# Patient Record
Sex: MALE | URBAN - METROPOLITAN AREA
[De-identification: names, ages, dates, MRNs, and addresses within clinical notes are randomized per-mention and may not be internally consistent; named-entity substitution may affect disease eponyms.]

---

## 2019-10-09 ENCOUNTER — APPOINTMENT (RX ONLY)
Dept: URBAN - METROPOLITAN AREA CLINIC 61 | Facility: CLINIC | Age: 69
Setting detail: DERMATOLOGY
End: 2019-10-09

## 2019-10-09 DIAGNOSIS — L82.1 OTHER SEBORRHEIC KERATOSIS: ICD-10-CM

## 2019-10-09 PROBLEM — D48.5 NEOPLASM OF UNCERTAIN BEHAVIOR OF SKIN: Status: ACTIVE | Noted: 2019-10-09

## 2019-10-09 PROCEDURE — ? BIOPSY BY SHAVE METHOD

## 2019-10-09 PROCEDURE — ? COUNSELING

## 2019-10-09 PROCEDURE — 11102 TANGNTL BX SKIN SINGLE LES: CPT

## 2019-10-09 PROCEDURE — 99202 OFFICE O/P NEW SF 15 MIN: CPT | Mod: 25

## 2019-10-09 ASSESSMENT — LOCATION SIMPLE DESCRIPTION DERM
LOCATION SIMPLE: LEFT FOREHEAD
LOCATION SIMPLE: RIGHT CALF

## 2019-10-09 ASSESSMENT — LOCATION DETAILED DESCRIPTION DERM
LOCATION DETAILED: RIGHT DISTAL CALF
LOCATION DETAILED: LEFT LATERAL FOREHEAD

## 2019-10-09 ASSESSMENT — LOCATION ZONE DERM
LOCATION ZONE: LEG
LOCATION ZONE: FACE

## 2019-10-09 NOTE — PROCEDURE: BIOPSY BY SHAVE METHOD
Depth Of Biopsy: dermis
Electrodesiccation Text: The wound bed was treated with electrodesiccation after the biopsy was performed.
Notification Instructions: Patient will be notified of biopsy results. However, patient instructed to call the office if not contacted within 2 weeks.
Biopsy Type: H and E
X Size Of Lesion In Cm: 0
Bill For Surgical Tray: no
Wound Care: Vaseline
Anesthesia Volume In Cc (Will Not Render If 0): 1
Dressing: pressure dressing with telfa
Type Of Destruction Used: Electrodesiccation
Electrodesiccation And Curettage Text: The wound bed was treated with electrodesiccation and curettage after the biopsy was performed.
Consent: Verbal consent was obtained and risks were reviewed including but not limited to scarring, infection, bleeding, scabbing, incomplete removal, nerve damage and allergy to anesthesia.
Biopsy Method: Dermablade
Hemostasis: Drysol
Silver Nitrate Text: The wound bed was treated with silver nitrate after the biopsy was performed.
Render Post-Care Instructions In Note?: yes
Cryotherapy Text: The wound bed was treated with cryotherapy after the biopsy was performed.
Anesthesia Type: 1% lidocaine without epinephrine
Lab Facility: [id number]
Post-Care Instructions: I reviewed with the patient in detail post-care instructions. Patient is to keep the biopsy site dry overnight, and then apply vaseline twice daily until healed.
Detail Level: Detailed
Lab: [id number]
Billing Type: Third-Party Bill

## 2019-11-01 ENCOUNTER — APPOINTMENT (RX ONLY)
Dept: URBAN - METROPOLITAN AREA CLINIC 61 | Facility: CLINIC | Age: 69
Setting detail: DERMATOLOGY
End: 2019-11-01

## 2019-11-01 VITALS
HEART RATE: 80 BPM | SYSTOLIC BLOOD PRESSURE: 153 MMHG | WEIGHT: 253 LBS | HEIGHT: 71 IN | DIASTOLIC BLOOD PRESSURE: 99 MMHG

## 2019-11-01 PROBLEM — C44.712 BASAL CELL CARCINOMA OF SKIN OF RIGHT LOWER LIMB, INCLUDING HIP: Status: ACTIVE | Noted: 2019-11-01

## 2019-11-01 PROCEDURE — ? PRESCRIPTION

## 2019-11-01 PROCEDURE — 11604 EXC TR-EXT MAL+MARG 3.1-4 CM: CPT

## 2019-11-01 PROCEDURE — 13122 CMPLX RPR S/A/L ADDL 5 CM/>: CPT

## 2019-11-01 PROCEDURE — 13121 CMPLX RPR S/A/L 2.6-7.5 CM: CPT

## 2019-11-01 PROCEDURE — ? EXCISION

## 2019-11-01 RX ORDER — MUPIROCIN 20 MG/G
OINTMENT TOPICAL DAILY
Qty: 1 | Refills: 1 | Status: ERX | COMMUNITY
Start: 2019-11-01

## 2019-11-01 RX ORDER — DOXYCYCLINE HYCLATE 100 MG/1
CAPSULE, GELATIN COATED ORAL
Qty: 14 | Refills: 0 | Status: ERX | COMMUNITY
Start: 2019-11-01

## 2019-11-01 RX ADMIN — DOXYCYCLINE HYCLATE: 100 CAPSULE, GELATIN COATED ORAL at 00:00

## 2019-11-01 RX ADMIN — MUPIROCIN: 20 OINTMENT TOPICAL at 00:00

## 2019-11-01 NOTE — PROCEDURE: EXCISION
Render Post-Care Instructions In Note?: yes
Curettage Prior To Excision?: No
Repair Type: Complex
Double M-Plasty Intermediate Repair Preamble Text (Leave Blank If You Do Not Want): Undermining was performed with blunt dissection.
O-Z Flap Text: The defect edges were debeveled with a #15 scalpel blade.  Given the location of the defect, shape of the defect and the proximity to free margins an O-Z flap was deemed most appropriate.  Using a sterile surgical marker, an appropriate transposition flap was drawn incorporating the defect and placing the expected incisions within the relaxed skin tension lines where possible. The area thus outlined was incised deep to adipose tissue with a #15 scalpel blade.  The skin margins were undermined to an appropriate distance in all directions utilizing iris scissors.
Where Do You Want The Question To Include Opioid Counseling Located?: Case Summary Tab
Complex Repair And W Plasty Text: The defect edges were debeveled with a #15 scalpel blade.  The primary defect was closed partially with a complex linear closure.  Given the location of the remaining defect, shape of the defect and the proximity to free margins a W plasty was deemed most appropriate for complete closure of the defect.  Using a sterile surgical marker, an appropriate advancement flap was drawn incorporating the defect and placing the expected incisions within the relaxed skin tension lines where possible.    The area thus outlined was incised deep to adipose tissue with a #15 scalpel blade.  The skin margins were undermined to an appropriate distance in all directions utilizing iris scissors.
Ear Star Wedge Flap Text: The defect edges were debeveled with a #15 blade scalpel.  Given the location of the defect and the proximity to free margins (helical rim) an ear star wedge flap was deemed most appropriate.  Using a sterile surgical marker, the appropriate flap was drawn incorporating the defect and placing the expected incisions between the helical rim and antihelix where possible.  The area thus outlined was incised through and through with a #15 scalpel blade.
Bilateral Helical Rim Advancement Flap Text: The defect edges were debeveled with a #15 blade scalpel.  Given the location of the defect and the proximity to free margins (helical rim) a bilateral helical rim advancement flap was deemed most appropriate.  Using a sterile surgical marker, the appropriate advancement flaps were drawn incorporating the defect and placing the expected incisions between the helical rim and antihelix where possible.  The area thus outlined was incised through and through with a #15 scalpel blade.  With a skin hook and iris scissors, the flaps were gently and sharply undermined and freed up.
W Plasty Text: The lesion was extirpated to the level of the fat with a #15 scalpel blade.  Given the location of the defect, shape of the defect and the proximity to free margins a W-plasty was deemed most appropriate for repair.  Using a sterile surgical marker, the appropriate transposition arms of the W-plasty were drawn incorporating the defect and placing the expected incisions within the relaxed skin tension lines where possible.    The area thus outlined was incised deep to adipose tissue with a #15 scalpel blade.  The skin margins were undermined to an appropriate distance in all directions utilizing iris scissors.  The opposing transposition arms were then transposed into place in opposite direction and anchored with interrupted buried subcutaneous sutures.
Purse String (Intermediate) Text: Given the location of the defect and the characteristics of the surrounding skin a pursestring intermediate closure was deemed most appropriate.  Undermining was performed circumfirentially around the surgical defect.  A purstring suture was then placed and tightened.
Complex Repair And Z Plasty Text: The defect edges were debeveled with a #15 scalpel blade.  The primary defect was closed partially with a complex linear closure.  Given the location of the remaining defect, shape of the defect and the proximity to free margins a Z plasty was deemed most appropriate for complete closure of the defect.  Using a sterile surgical marker, an appropriate advancement flap was drawn incorporating the defect and placing the expected incisions within the relaxed skin tension lines where possible.    The area thus outlined was incised deep to adipose tissue with a #15 scalpel blade.  The skin margins were undermined to an appropriate distance in all directions utilizing iris scissors.
Curvilinear Excision Additional Text (Leave Blank If You Do Not Want): The margin was drawn around the clinically apparent lesion.  A curvilinear shape was then drawn on the skin incorporating the lesion and margins.  Incisions were then made along these lines to the appropriate tissue plane and the lesion was extirpated.
Double O-Z Flap Text: The defect edges were debeveled with a #15 scalpel blade.  Given the location of the defect, shape of the defect and the proximity to free margins a Double O-Z flap was deemed most appropriate.  Using a sterile surgical marker, an appropriate transposition flap was drawn incorporating the defect and placing the expected incisions within the relaxed skin tension lines where possible. The area thus outlined was incised deep to adipose tissue with a #15 scalpel blade.  The skin margins were undermined to an appropriate distance in all directions utilizing iris scissors.
Anesthesia Type: 1% lidocaine with epinephrine
Banner Transposition Flap Text: The defect edges were debeveled with a #15 scalpel blade.  Given the location of the defect and the proximity to free margins a Banner transposition flap was deemed most appropriate.  Using a sterile surgical marker, an appropriate flap drawn around the defect. The area thus outlined was incised deep to adipose tissue with a #15 scalpel blade.  The skin margins were undermined to an appropriate distance in all directions utilizing iris scissors.
Anesthesia Type: 1% lidocaine without epinephrine
Cheek Interpolation Flap Text: A decision was made to reconstruct the defect utilizing an interpolation axial flap and a staged reconstruction.  A telfa template was made of the defect.  This telfa template was then used to outline the Cheek Interpolation flap.  The donor area for the pedicle flap was then injected with anesthesia.  The flap was excised through the skin and subcutaneous tissue down to the layer of the underlying musculature.  The interpolation flap was carefully excised within this deep plane to maintain its blood supply.  The edges of the donor site were undermined.   The donor site was closed in a primary fashion.  The pedicle was then rotated into position and sutured.  Once the tube was sutured into place, adequate blood supply was confirmed with blanching and refill.  The pedicle was then wrapped with xeroform gauze and dressed appropriately with a telfa and gauze bandage to ensure continued blood supply and protect the attached pedicle.
Z Plasty Text: The lesion was extirpated to the level of the fat with a #15 scalpel blade.  Given the location of the defect, shape of the defect and the proximity to free margins a Z-plasty was deemed most appropriate for repair.  Using a sterile surgical marker, the appropriate transposition arms of the Z-plasty were drawn incorporating the defect and placing the expected incisions within the relaxed skin tension lines where possible.    The area thus outlined was incised deep to adipose tissue with a #15 scalpel blade.  The skin margins were undermined to an appropriate distance in all directions utilizing iris scissors.  The opposing transposition arms were then transposed into place in opposite direction and anchored with interrupted buried subcutaneous sutures.
Purse String (Simple) Text: Given the location of the defect and the characteristics of the surrounding skin a purse string simple closure was deemed most appropriate.  Undermining was performed circumferentially around the surgical defect.  A purse string suture was then placed and tightened.
Wound Care: Vaseline
Bilobed Flap Text: The defect edges were debeveled with a #15 scalpel blade.  Given the location of the defect and the proximity to free margins a bilobe flap was deemed most appropriate.  Using a sterile surgical marker, an appropriate bilobe flap drawn around the defect.    The area thus outlined was incised deep to adipose tissue with a #15 scalpel blade.  The skin margins were undermined to an appropriate distance in all directions utilizing iris scissors.
Post-Care Instructions: I reviewed with the patient in detail post-care instructions. Patient is not to engage in any heavy lifting, exercise, or swimming for the next 14 days. Should the patient develop any fevers, chills, bleeding, severe pain patient will contact the office immediately.
Cheek-To-Nose Interpolation Flap Text: A decision was made to reconstruct the defect utilizing an interpolation axial flap and a staged reconstruction.  A telfa template was made of the defect.  This telfa template was then used to outline the Cheek-To-Nose Interpolation flap.  The donor area for the pedicle flap was then injected with anesthesia.  The flap was excised through the skin and subcutaneous tissue down to the layer of the underlying musculature.  The interpolation flap was carefully excised within this deep plane to maintain its blood supply.  The edges of the donor site were undermined.   The donor site was closed in a primary fashion.  The pedicle was then rotated into position and sutured.  Once the tube was sutured into place, adequate blood supply was confirmed with blanching and refill.  The pedicle was then wrapped with xeroform gauze and dressed appropriately with a telfa and gauze bandage to ensure continued blood supply and protect the attached pedicle.
Epidermal Closure Graft Donor Site (Optional): simple interrupted
Partial Purse String (Simple) Text: Given the location of the defect and the characteristics of the surrounding skin a simple purse string closure was deemed most appropriate.  Undermining was performed circumferentially around the surgical defect.  A purse string suture was then placed and tightened. Wound tension of the circular defect prevented complete closure of the wound.
Intermediate / Complex Repair - Final Wound Length In Cm: 9
Partial Purse String (Intermediate) Text: Given the location of the defect and the characteristics of the surrounding skin an intermediate purse string closure was deemed most appropriate.  Undermining was performed circumferentially around the surgical defect.  A purse string suture was then placed and tightened. Wound tension of the circular defect prevented complete closure of the wound.
Fusiform Excision Additional Text (Leave Blank If You Do Not Want): The margin was drawn around the clinically apparent lesion.  A fusiform shape was then drawn on the skin incorporating the lesion and margins.  Incisions were then made along these lines to the appropriate tissue plane and the lesion was extirpated.
Complex Repair And Dorsal Nasal Flap Text: The defect edges were debeveled with a #15 scalpel blade.  The primary defect was closed partially with a complex linear closure.  Given the location of the remaining defect, shape of the defect and the proximity to free margins a dorsal nasal flap was deemed most appropriate for complete closure of the defect.  Using a sterile surgical marker, an appropriate flap was drawn incorporating the defect and placing the expected incisions within the relaxed skin tension lines where possible.    The area thus outlined was incised deep to adipose tissue with a #15 scalpel blade.  The skin margins were undermined to an appropriate distance in all directions utilizing iris scissors.
V-Y Flap Text: The defect edges were debeveled with a #15 scalpel blade.  Given the location of the defect, shape of the defect and the proximity to free margins a V-Y flap was deemed most appropriate.  Using a sterile surgical marker, an appropriate advancement flap was drawn incorporating the defect and placing the expected incisions within the relaxed skin tension lines where possible.    The area thus outlined was incised deep to adipose tissue with a #15 scalpel blade.  The skin margins were undermined to an appropriate distance in all directions utilizing iris scissors.
Complex Repair And Single Advancement Flap Text: The defect edges were debeveled with a #15 scalpel blade.  The primary defect was closed partially with a complex linear closure.  Given the location of the remaining defect, shape of the defect and the proximity to free margins a single advancement flap was deemed most appropriate for complete closure of the defect.  Using a sterile surgical marker, an appropriate advancement flap was drawn incorporating the defect and placing the expected incisions within the relaxed skin tension lines where possible.    The area thus outlined was incised deep to adipose tissue with a #15 scalpel blade.  The skin margins were undermined to an appropriate distance in all directions utilizing iris scissors.
Interpolation Flap Text: A decision was made to reconstruct the defect utilizing an interpolation axial flap and a staged reconstruction.  A telfa template was made of the defect.  This telfa template was then used to outline the interpolation flap.  The donor area for the pedicle flap was then injected with anesthesia.  The flap was excised through the skin and subcutaneous tissue down to the layer of the underlying musculature.  The interpolation flap was carefully excised within this deep plane to maintain its blood supply.  The edges of the donor site were undermined.   The donor site was closed in a primary fashion.  The pedicle was then rotated into position and sutured.  Once the tube was sutured into place, adequate blood supply was confirmed with blanching and refill.  The pedicle was then wrapped with xeroform gauze and dressed appropriately with a telfa and gauze bandage to ensure continued blood supply and protect the attached pedicle.
Anesthesia Volume In Cc: 0
Home Suture Removal Text: Patient was provided a home suture removal kit and will remove their sutures at home.  If they have any questions or difficulties they will call the office.
Saucerization Excision Additional Text (Leave Blank If You Do Not Want): The margin was drawn around the clinically apparent lesion.  Incisions were then made along these lines, in a tangential fashion, to the appropriate tissue plane and the lesion was extirpated.
Dressing: pressure dressing with telfa
Elliptical Excision Additional Text (Leave Blank If You Do Not Want): The margin was drawn around the clinically apparent lesion.  An elliptical shape was then drawn on the skin incorporating the lesion and margins.  Incisions were then made along these lines to the appropriate tissue plane and the lesion was extirpated.
Advancement-Rotation Flap Text: The defect edges were debeveled with a #15 scalpel blade.  Given the location of the defect, shape of the defect and the proximity to free margins an advancement-rotation flap was deemed most appropriate.  Using a sterile surgical marker, an appropriate flap was drawn incorporating the defect and placing the expected incisions within the relaxed skin tension lines where possible. The area thus outlined was incised deep to adipose tissue with a #15 scalpel blade.  The skin margins were undermined to an appropriate distance in all directions utilizing iris scissors.
Complex Repair And Ftsg Text: The defect edges were debeveled with a #15 scalpel blade.  The primary defect was closed partially with a complex linear closure.  Given the location of the defect, shape of the defect and the proximity to free margins a full thickness skin graft was deemed most appropriate to repair the remaining defect.  The graft was trimmed to fit the size of the remaining defect.  The graft was then placed in the primary defect, oriented appropriately, and sutured into place.
Bilobed Transposition Flap Text: The defect edges were debeveled with a #15 scalpel blade.  Given the location of the defect and the proximity to free margins a bilobed transposition flap was deemed most appropriate.  Using a sterile surgical marker, an appropriate bilobe flap drawn around the defect.    The area thus outlined was incised deep to adipose tissue with a #15 scalpel blade.  The skin margins were undermined to an appropriate distance in all directions utilizing iris scissors.
Slit Excision Additional Text (Leave Blank If You Do Not Want): A linear line was drawn on the skin overlying the lesion. An incision was made slowly until the lesion was visualized.  Once visualized, the lesion was removed with blunt dissection.
Complex Repair And Epidermal Autograft Text: The defect edges were debeveled with a #15 scalpel blade.  The primary defect was closed partially with a complex linear closure.  Given the location of the defect, shape of the defect and the proximity to free margins an epidermal autograft was deemed most appropriate to repair the remaining defect.  The graft was trimmed to fit the size of the remaining defect.  The graft was then placed in the primary defect, oriented appropriately, and sutured into place.
Modified Advancement Flap Text: The defect edges were debeveled with a #15 scalpel blade.  Given the location of the defect, shape of the defect and the proximity to free margins a modified advancement flap was deemed most appropriate.  Using a sterile surgical marker, an appropriate advancement flap was drawn incorporating the defect and placing the expected incisions within the relaxed skin tension lines where possible.    The area thus outlined was incised deep to adipose tissue with a #15 scalpel blade.  The skin margins were undermined to an appropriate distance in all directions utilizing iris scissors.
Complex Repair And Split-Thickness Skin Graft Text: The defect edges were debeveled with a #15 scalpel blade.  The primary defect was closed partially with a complex linear closure.  Given the location of the defect, shape of the defect and the proximity to free margins a split thickness skin graft was deemed most appropriate to repair the remaining defect.  The graft was trimmed to fit the size of the remaining defect.  The graft was then placed in the primary defect, oriented appropriately, and sutured into place.
Mercedes Flap Text: The defect edges were debeveled with a #15 scalpel blade.  Given the location of the defect, shape of the defect and the proximity to free margins a Mercedes flap was deemed most appropriate.  Using a sterile surgical marker, an appropriate advancement flap was drawn incorporating the defect and placing the expected incisions within the relaxed skin tension lines where possible. The area thus outlined was incised deep to adipose tissue with a #15 scalpel blade.  The skin margins were undermined to an appropriate distance in all directions utilizing iris scissors.
Trilobed Flap Text: The defect edges were debeveled with a #15 scalpel blade.  Given the location of the defect and the proximity to free margins a trilobed flap was deemed most appropriate.  Using a sterile surgical marker, an appropriate trilobed flap drawn around the defect.    The area thus outlined was incised deep to adipose tissue with a #15 scalpel blade.  The skin margins were undermined to an appropriate distance in all directions utilizing iris scissors.
Melolabial Interpolation Flap Text: A decision was made to reconstruct the defect utilizing an interpolation axial flap and a staged reconstruction.  A telfa template was made of the defect.  This telfa template was then used to outline the melolabial interpolation flap.  The donor area for the pedicle flap was then injected with anesthesia.  The flap was excised through the skin and subcutaneous tissue down to the layer of the underlying musculature.  The pedicle flap was carefully excised within this deep plane to maintain its blood supply.  The edges of the donor site were undermined.   The donor site was closed in a primary fashion.  The pedicle was then rotated into position and sutured.  Once the tube was sutured into place, adequate blood supply was confirmed with blanching and refill.  The pedicle was then wrapped with xeroform gauze and dressed appropriately with a telfa and gauze bandage to ensure continued blood supply and protect the attached pedicle.
Complex Repair And Double Advancement Flap Text: The defect edges were debeveled with a #15 scalpel blade.  The primary defect was closed partially with a complex linear closure.  Given the location of the remaining defect, shape of the defect and the proximity to free margins a double advancement flap was deemed most appropriate for complete closure of the defect.  Using a sterile surgical marker, an appropriate advancement flap was drawn incorporating the defect and placing the expected incisions within the relaxed skin tension lines where possible.    The area thus outlined was incised deep to adipose tissue with a #15 scalpel blade.  The skin margins were undermined to an appropriate distance in all directions utilizing iris scissors.
Excisional Biopsy Additional Text (Leave Blank If You Do Not Want): The margin was drawn around the clinically apparent lesion.  An elliptical shape was then drawn on the skin incorporating the lesion and margins.  Incisions were then made along these lines to the appropriate tissue plane and the lesion was extirpated.
Mucosal Advancement Flap Text: Given the location of the defect, shape of the defect and the proximity to free margins a mucosal advancement flap was deemed most appropriate. Incisions were made with a 15 blade scalpel in the appropriate fashion along the cutaneous vermillion border and the mucosal lip. The remaining actinically damaged mucosal tissue was excised.  The mucosal advancement flap was then elevated to the gingival sulcus with care taken to preserve the neurovascular structures and advanced into the primary defect. Care was taken to ensure that precise realignment of the vermillion border was achieved.
Billing Type: Third-Party Bill
Complex Repair And Dermal Autograft Text: The defect edges were debeveled with a #15 scalpel blade.  The primary defect was closed partially with a complex linear closure.  Given the location of the defect, shape of the defect and the proximity to free margins an dermal autograft was deemed most appropriate to repair the remaining defect.  The graft was trimmed to fit the size of the remaining defect.  The graft was then placed in the primary defect, oriented appropriately, and sutured into place.
Island Pedicle Flap Text: The defect edges were debeveled with a #15 scalpel blade.  Given the location of the defect, shape of the defect and the proximity to free margins an island pedicle advancement flap was deemed most appropriate.  Using a sterile surgical marker, an appropriate advancement flap was drawn incorporating the defect, outlining the appropriate donor tissue and placing the expected incisions within the relaxed skin tension lines where possible.    The area thus outlined was incised deep to adipose tissue with a #15 scalpel blade.  The skin margins were undermined to an appropriate distance in all directions around the primary defect and laterally outward around the island pedicle utilizing iris scissors.  There was minimal undermining beneath the pedicle flap.
Dorsal Nasal Flap Text: The defect edges were debeveled with a #15 scalpel blade.  Given the location of the defect and the proximity to free margins a dorsal nasal flap was deemed most appropriate.  Using a sterile surgical marker, an appropriate dorsal nasal flap was drawn around the defect.    The area thus outlined was incised deep to adipose tissue with a #15 scalpel blade.  The skin margins were undermined to an appropriate distance in all directions utilizing iris scissors.
Mastoid Interpolation Flap Text: A decision was made to reconstruct the defect utilizing an interpolation axial flap and a staged reconstruction.  A telfa template was made of the defect.  This telfa template was then used to outline the mastoid interpolation flap.  The donor area for the pedicle flap was then injected with anesthesia.  The flap was excised through the skin and subcutaneous tissue down to the layer of the underlying musculature.  The pedicle flap was carefully excised within this deep plane to maintain its blood supply.  The edges of the donor site were undermined.   The donor site was closed in a primary fashion.  The pedicle was then rotated into position and sutured.  Once the tube was sutured into place, adequate blood supply was confirmed with blanching and refill.  The pedicle was then wrapped with xeroform gauze and dressed appropriately with a telfa and gauze bandage to ensure continued blood supply and protect the attached pedicle.
Hatchet Flap Text: The defect edges were debeveled with a #15 scalpel blade.  Given the location of the defect, shape of the defect and the proximity to free margins a hatchet flap was deemed most appropriate.  Using a sterile surgical marker, an appropriate hatchet flap was drawn incorporating the defect and placing the expected incisions within the relaxed skin tension lines where possible.    The area thus outlined was incised deep to adipose tissue with a #15 scalpel blade.  The skin margins were undermined to an appropriate distance in all directions utilizing iris scissors.
Island Pedicle Flap With Canthal Suspension Text: The defect edges were debeveled with a #15 scalpel blade.  Given the location of the defect, shape of the defect and the proximity to free margins an island pedicle advancement flap was deemed most appropriate.  Using a sterile surgical marker, an appropriate advancement flap was drawn incorporating the defect, outlining the appropriate donor tissue and placing the expected incisions within the relaxed skin tension lines where possible. The area thus outlined was incised deep to adipose tissue with a #15 scalpel blade.  The skin margins were undermined to an appropriate distance in all directions around the primary defect and laterally outward around the island pedicle utilizing iris scissors.  There was minimal undermining beneath the pedicle flap. A suspension suture was placed in the canthal tendon to prevent tension and prevent ectropion.
Complex Repair And Tissue Cultured Epidermal Autograft Text: The defect edges were debeveled with a #15 scalpel blade.  The primary defect was closed partially with a complex linear closure.  Given the location of the defect, shape of the defect and the proximity to free margins an tissue cultured epidermal autograft was deemed most appropriate to repair the remaining defect.  The graft was trimmed to fit the size of the remaining defect.  The graft was then placed in the primary defect, oriented appropriately, and sutured into place.
Paramedian Forehead Flap Text: A decision was made to reconstruct the defect utilizing an interpolation axial flap and a staged reconstruction.  A telfa template was made of the defect.  This telfa template was then used to outline the paramedian forehead pedicle flap.  The donor area for the pedicle flap was then injected with anesthesia.  The flap was excised through the skin and subcutaneous tissue down to the layer of the underlying musculature.  The pedicle flap was carefully excised within this deep plane to maintain its blood supply.  The edges of the donor site were undermined.   The donor site was closed in a primary fashion.  The pedicle was then rotated into position and sutured.  Once the tube was sutured into place, adequate blood supply was confirmed with blanching and refill.  The pedicle was then wrapped with xeroform gauze and dressed appropriately with a telfa and gauze bandage to ensure continued blood supply and protect the attached pedicle.
Posterior Auricular Interpolation Flap Text: A decision was made to reconstruct the defect utilizing an interpolation axial flap and a staged reconstruction.  A telfa template was made of the defect.  This telfa template was then used to outline the posterior auricular interpolation flap.  The donor area for the pedicle flap was then injected with anesthesia.  The flap was excised through the skin and subcutaneous tissue down to the layer of the underlying musculature.  The pedicle flap was carefully excised within this deep plane to maintain its blood supply.  The edges of the donor site were undermined.   The donor site was closed in a primary fashion.  The pedicle was then rotated into position and sutured.  Once the tube was sutured into place, adequate blood supply was confirmed with blanching and refill.  The pedicle was then wrapped with xeroform gauze and dressed appropriately with a telfa and gauze bandage to ensure continued blood supply and protect the attached pedicle.
Deep Sutures: 3-0 Vicryl
Complex Repair And Modified Advancement Flap Text: The defect edges were debeveled with a #15 scalpel blade.  The primary defect was closed partially with a complex linear closure.  Given the location of the remaining defect, shape of the defect and the proximity to free margins a modified advancement flap was deemed most appropriate for complete closure of the defect.  Using a sterile surgical marker, an appropriate advancement flap was drawn incorporating the defect and placing the expected incisions within the relaxed skin tension lines where possible.    The area thus outlined was incised deep to adipose tissue with a #15 scalpel blade.  The skin margins were undermined to an appropriate distance in all directions utilizing iris scissors.
Perilesional Excision Additional Text (Leave Blank If You Do Not Want): The margin was drawn around the clinically apparent lesion. Incisions were then made along these lines to the appropriate tissue plane and the lesion was extirpated.
Alar Island Pedicle Flap Text: The defect edges were debeveled with a #15 scalpel blade.  Given the location of the defect, shape of the defect and the proximity to the alar rim an island pedicle advancement flap was deemed most appropriate.  Using a sterile surgical marker, an appropriate advancement flap was drawn incorporating the defect, outlining the appropriate donor tissue and placing the expected incisions within the nasal ala running parallel to the alar rim. The area thus outlined was incised with a #15 scalpel blade.  The skin margins were undermined minimally to an appropriate distance in all directions around the primary defect and laterally outward around the island pedicle utilizing iris scissors.  There was minimal undermining beneath the pedicle flap.
Hemostasis: Electrocautery
Lip Wedge Excision Repair Text: Given the location of the defect and the proximity to free margins a full thickness wedge repair was deemed most appropriate.  Using a sterile surgical marker, the appropriate repair was drawn incorporating the defect and placing the expected incisions perpendicular to the vermillion border.  The vermillion border was also meticulously outlined to ensure appropriate reapproximation during the repair.  The area thus outlined was incised through and through with a #15 scalpel blade.  The muscularis and dermis were reaproximated with deep sutures following hemostasis. Care was taken to realign the vermillion border before proceeding with the superficial closure.  Once the vermillion was realigned the superfical and mucosal closure was finished.
Positioning (Leave Blank If You Do Not Want): The patient was placed in a comfortable position exposing the surgical site.
Complex Repair And A-T Advancement Flap Text: The defect edges were debeveled with a #15 scalpel blade.  The primary defect was closed partially with a complex linear closure.  Given the location of the remaining defect, shape of the defect and the proximity to free margins an A-T advancement flap was deemed most appropriate for complete closure of the defect.  Using a sterile surgical marker, an appropriate advancement flap was drawn incorporating the defect and placing the expected incisions within the relaxed skin tension lines where possible.    The area thus outlined was incised deep to adipose tissue with a #15 scalpel blade.  The skin margins were undermined to an appropriate distance in all directions utilizing iris scissors.
Rotation Flap Text: The defect edges were debeveled with a #15 scalpel blade.  Given the location of the defect, shape of the defect and the proximity to free margins a rotation flap was deemed most appropriate.  Using a sterile surgical marker, an appropriate rotation flap was drawn incorporating the defect and placing the expected incisions within the relaxed skin tension lines where possible.    The area thus outlined was incised deep to adipose tissue with a #15 scalpel blade.  The skin margins were undermined to an appropriate distance in all directions utilizing iris scissors.
Ftsg Text: The defect edges were debeveled with a #15 scalpel blade.  Given the location of the defect, shape of the defect and the proximity to free margins a full thickness skin graft was deemed most appropriate.  Using a sterile surgical marker, the primary defect shape was transferred to the donor site. The area thus outlined was incised deep to adipose tissue with a #15 scalpel blade.  The harvested graft was then trimmed of adipose tissue until only dermis and epidermis was left.  The skin margins of the secondary defect were undermined to an appropriate distance in all directions utilizing iris scissors.  The secondary defect was closed with interrupted buried subcutaneous sutures.  The skin edges were then re-apposed with running  sutures.  The skin graft was then placed in the primary defect and oriented appropriately.
Complex Repair Preamble Text (Leave Blank If You Do Not Want): Extensive wide undermining was performed.
Complex Repair And O-L Flap Text: The defect edges were debeveled with a #15 scalpel blade.  The primary defect was closed partially with a complex linear closure.  Given the location of the remaining defect, shape of the defect and the proximity to free margins an O-L flap was deemed most appropriate for complete closure of the defect.  Using a sterile surgical marker, an appropriate flap was drawn incorporating the defect and placing the expected incisions within the relaxed skin tension lines where possible.    The area thus outlined was incised deep to adipose tissue with a #15 scalpel blade.  The skin margins were undermined to an appropriate distance in all directions utilizing iris scissors.
No Repair - Repaired With Adjacent Surgical Defect Text (Leave Blank If You Do Not Want): After the excision the defect was repaired concurrently with another surgical defect which was in close approximation.
Complex Repair And O-T Advancement Flap Text: The defect edges were debeveled with a #15 scalpel blade.  The primary defect was closed partially with a complex linear closure.  Given the location of the remaining defect, shape of the defect and the proximity to free margins an O-T advancement flap was deemed most appropriate for complete closure of the defect.  Using a sterile surgical marker, an appropriate advancement flap was drawn incorporating the defect and placing the expected incisions within the relaxed skin tension lines where possible.    The area thus outlined was incised deep to adipose tissue with a #15 scalpel blade.  The skin margins were undermined to an appropriate distance in all directions utilizing iris scissors.
Pre-Excision Curettage Text (Leave Blank If You Do Not Want): Prior to drawing the surgical margin the visible lesion was removed with electrodesiccation and curettage to clearly define the lesion size.
Repair Performed By Another Provider Text (Leave Blank If You Do Not Want): After the tissue was excised the defect was repaired by another provider.
Spiral Flap Text: The defect edges were debeveled with a #15 scalpel blade.  Given the location of the defect, shape of the defect and the proximity to free margins a spiral flap was deemed most appropriate.  Using a sterile surgical marker, an appropriate rotation flap was drawn incorporating the defect and placing the expected incisions within the relaxed skin tension lines where possible. The area thus outlined was incised deep to adipose tissue with a #15 scalpel blade.  The skin margins were undermined to an appropriate distance in all directions utilizing iris scissors.
Double Island Pedicle Flap Text: The defect edges were debeveled with a #15 scalpel blade.  Given the location of the defect, shape of the defect and the proximity to free margins a double island pedicle advancement flap was deemed most appropriate.  Using a sterile surgical marker, an appropriate advancement flap was drawn incorporating the defect, outlining the appropriate donor tissue and placing the expected incisions within the relaxed skin tension lines where possible.    The area thus outlined was incised deep to adipose tissue with a #15 scalpel blade.  The skin margins were undermined to an appropriate distance in all directions around the primary defect and laterally outward around the island pedicle utilizing iris scissors.  There was minimal undermining beneath the pedicle flap.
Estimated Blood Loss (Cc): minimal
Size Of Lesion In Cm: 2.5
Split-Thickness Skin Graft Text: The defect edges were debeveled with a #15 scalpel blade.  Given the location of the defect, shape of the defect and the proximity to free margins a split thickness skin graft was deemed most appropriate.  Using a sterile surgical marker, the primary defect shape was transferred to the donor site. The split thickness graft was then harvested.  The skin graft was then placed in the primary defect and oriented appropriately.
Advancement Flap (Single) Text: The defect edges were debeveled with a #15 scalpel blade.  Given the location of the defect and the proximity to free margins a single advancement flap was deemed most appropriate.  Using a sterile surgical marker, an appropriate advancement flap was drawn incorporating the defect and placing the expected incisions within the relaxed skin tension lines where possible.    The area thus outlined was incised deep to adipose tissue with a #15 scalpel blade.  The skin margins were undermined to an appropriate distance in all directions utilizing iris scissors.
Complex Repair And Bilobe Flap Text: The defect edges were debeveled with a #15 scalpel blade.  The primary defect was closed partially with a complex linear closure.  Given the location of the remaining defect, shape of the defect and the proximity to free margins a bilobe flap was deemed most appropriate for complete closure of the defect.  Using a sterile surgical marker, an appropriate advancement flap was drawn incorporating the defect and placing the expected incisions within the relaxed skin tension lines where possible.    The area thus outlined was incised deep to adipose tissue with a #15 scalpel blade.  The skin margins were undermined to an appropriate distance in all directions utilizing iris scissors.
Transposition Flap Text: The defect edges were debeveled with a #15 scalpel blade.  Given the location of the defect and the proximity to free margins a transposition flap was deemed most appropriate.  Using a sterile surgical marker, an appropriate transposition flap was drawn incorporating the defect.    The area thus outlined was incised deep to adipose tissue with a #15 scalpel blade.  The skin margins were undermined to an appropriate distance in all directions utilizing iris scissors.
Star Wedge Flap Text: The defect edges were debeveled with a #15 scalpel blade.  Given the location of the defect, shape of the defect and the proximity to free margins a star wedge flap was deemed most appropriate.  Using a sterile surgical marker, an appropriate rotation flap was drawn incorporating the defect and placing the expected incisions within the relaxed skin tension lines where possible. The area thus outlined was incised deep to adipose tissue with a #15 scalpel blade.  The skin margins were undermined to an appropriate distance in all directions utilizing iris scissors.
Island Pedicle Flap-Requiring Vessel Identification Text: The defect edges were debeveled with a #15 scalpel blade.  Given the location of the defect, shape of the defect and the proximity to free margins an island pedicle advancement flap was deemed most appropriate.  Using a sterile surgical marker, an appropriate advancement flap was drawn, based on the axial vessel mentioned above, incorporating the defect, outlining the appropriate donor tissue and placing the expected incisions within the relaxed skin tension lines where possible.    The area thus outlined was incised deep to adipose tissue with a #15 scalpel blade.  The skin margins were undermined to an appropriate distance in all directions around the primary defect and laterally outward around the island pedicle utilizing iris scissors.  There was minimal undermining beneath the pedicle flap.
Lab: [id number]
Complex Repair And Melolabial Flap Text: The defect edges were debeveled with a #15 scalpel blade.  The primary defect was closed partially with a complex linear closure.  Given the location of the remaining defect, shape of the defect and the proximity to free margins a melolabial flap was deemed most appropriate for complete closure of the defect.  Using a sterile surgical marker, an appropriate advancement flap was drawn incorporating the defect and placing the expected incisions within the relaxed skin tension lines where possible.    The area thus outlined was incised deep to adipose tissue with a #15 scalpel blade.  The skin margins were undermined to an appropriate distance in all directions utilizing iris scissors.
Cartilage Graft Text: The defect edges were debeveled with a #15 scalpel blade.  Given the location of the defect, shape of the defect, the fact the defect involved a full thickness cartilage defect a cartilage graft was deemed most appropriate.  An appropriate donor site was identified, cleansed, and anesthetized. The cartilage graft was then harvested and transferred to the recipient site, oriented appropriately and then sutured into place.  The secondary defect was then repaired using a primary closure.
Lab Facility: [id number]
Keystone Flap Text: The defect edges were debeveled with a #15 scalpel blade.  Given the location of the defect, shape of the defect a keystone flap was deemed most appropriate.  Using a sterile surgical marker, an appropriate keystone flap was drawn incorporating the defect, outlining the appropriate donor tissue and placing the expected incisions within the relaxed skin tension lines where possible. The area thus outlined was incised deep to adipose tissue with a #15 scalpel blade.  The skin margins were undermined to an appropriate distance in all directions around the primary defect and laterally outward around the flap utilizing iris scissors.
Complex Repair And Xenograft Text: The defect edges were debeveled with a #15 scalpel blade.  The primary defect was closed partially with a complex linear closure.  Given the location of the defect, shape of the defect and the proximity to free margins an tissue cultured epidermal autograft was deemed most appropriate to repair the remaining defect.  The graft was trimmed to fit the size of the remaining defect.  The graft was then placed in the primary defect, oriented appropriately, and sutured into place.
Burow's Advancement Flap Text: The defect edges were debeveled with a #15 scalpel blade.  Given the location of the defect and the proximity to free margins a Burow's advancement flap was deemed most appropriate.  Using a sterile surgical marker, the appropriate advancement flap was drawn incorporating the defect and placing the expected incisions within the relaxed skin tension lines where possible.    The area thus outlined was incised deep to adipose tissue with a #15 scalpel blade.  The skin margins were undermined to an appropriate distance in all directions utilizing iris scissors.
Melolabial Transposition Flap Text: The defect edges were debeveled with a #15 scalpel blade.  Given the location of the defect and the proximity to free margins a melolabial flap was deemed most appropriate.  Using a sterile surgical marker, an appropriate melolabial transposition flap was drawn incorporating the defect.    The area thus outlined was incised deep to adipose tissue with a #15 scalpel blade.  The skin margins were undermined to an appropriate distance in all directions utilizing iris scissors.
Excision Depth: adipose tissue
Size Of Margin In Cm: 0.5
Advancement Flap (Double) Text: The defect edges were debeveled with a #15 scalpel blade.  Given the location of the defect and the proximity to free margins a double advancement flap was deemed most appropriate.  Using a sterile surgical marker, the appropriate advancement flaps were drawn incorporating the defect and placing the expected incisions within the relaxed skin tension lines where possible.    The area thus outlined was incised deep to adipose tissue with a #15 scalpel blade.  The skin margins were undermined to an appropriate distance in all directions utilizing iris scissors.
Graft Donor Site Bandage (Optional-Leave Blank If You Don't Want In Note): Steri-strips and a pressure bandage were applied to the donor site.
Muscle Hinge Flap Text: The defect edges were debeveled with a #15 scalpel blade.  Given the size, depth and location of the defect and the proximity to free margins a muscle hinge flap was deemed most appropriate.  Using a sterile surgical marker, an appropriate hinge flap was drawn incorporating the defect. The area thus outlined was incised with a #15 scalpel blade.  The skin margins were undermined to an appropriate distance in all directions utilizing iris scissors.
Complex Repair And Rotation Flap Text: The defect edges were debeveled with a #15 scalpel blade.  The primary defect was closed partially with a complex linear closure.  Given the location of the remaining defect, shape of the defect and the proximity to free margins a rotation flap was deemed most appropriate for complete closure of the defect.  Using a sterile surgical marker, an appropriate advancement flap was drawn incorporating the defect and placing the expected incisions within the relaxed skin tension lines where possible.    The area thus outlined was incised deep to adipose tissue with a #15 scalpel blade.  The skin margins were undermined to an appropriate distance in all directions utilizing iris scissors.
O-T Plasty Text: The defect edges were debeveled with a #15 scalpel blade.  Given the location of the defect, shape of the defect and the proximity to free margins an O-T plasty was deemed most appropriate.  Using a sterile surgical marker, an appropriate O-T plasty was drawn incorporating the defect and placing the expected incisions within the relaxed skin tension lines where possible.    The area thus outlined was incised deep to adipose tissue with a #15 scalpel blade.  The skin margins were undermined to an appropriate distance in all directions utilizing iris scissors.
Information: Selecting Yes will display possible errors in your note based on the variables you have selected. This validation is only offered as a suggestion for you. PLEASE NOTE THAT THE VALIDATION TEXT WILL BE REMOVED WHEN YOU FINALIZE YOUR NOTE. IF YOU WANT TO FAX A PRELIMINARY NOTE YOU WILL NEED TO TOGGLE THIS TO 'NO' IF YOU DO NOT WANT IT IN YOUR FAXED NOTE.
Composite Graft Text: The defect edges were debeveled with a #15 scalpel blade.  Given the location of the defect, shape of the defect, the proximity to free margins and the fact the defect was full thickness a composite graft was deemed most appropriate.  The defect was outline and then transferred to the donor site.  A full thickness graft was then excised from the donor site. The graft was then placed in the primary defect, oriented appropriately and then sutured into place.  The secondary defect was then repaired using a primary closure.
Complex Repair And Rhombic Flap Text: The defect edges were debeveled with a #15 scalpel blade.  The primary defect was closed partially with a complex linear closure.  Given the location of the remaining defect, shape of the defect and the proximity to free margins a rhombic flap was deemed most appropriate for complete closure of the defect.  Using a sterile surgical marker, an appropriate advancement flap was drawn incorporating the defect and placing the expected incisions within the relaxed skin tension lines where possible.    The area thus outlined was incised deep to adipose tissue with a #15 scalpel blade.  The skin margins were undermined to an appropriate distance in all directions utilizing iris scissors.
Chonodrocutaneous Helical Advancement Flap Text: The defect edges were debeveled with a #15 scalpel blade.  Given the location of the defect and the proximity to free margins a chondrocutaneous helical advancement flap was deemed most appropriate.  Using a sterile surgical marker, the appropriate advancement flap was drawn incorporating the defect and placing the expected incisions within the relaxed skin tension lines where possible.    The area thus outlined was incised deep to adipose tissue with a #15 scalpel blade.  The skin margins were undermined to an appropriate distance in all directions utilizing iris scissors.
Detail Level: Detailed
Rhombic Flap Text: The defect edges were debeveled with a #15 scalpel blade.  Given the location of the defect and the proximity to free margins a rhombic flap was deemed most appropriate.  Using a sterile surgical marker, an appropriate rhombic flap was drawn incorporating the defect.    The area thus outlined was incised deep to adipose tissue with a #15 scalpel blade.  The skin margins were undermined to an appropriate distance in all directions utilizing iris scissors.
Scalpel Size: 15 blade
Double O-Z Plasty Text: The defect edges were debeveled with a #15 scalpel blade.  Given the location of the defect, shape of the defect and the proximity to free margins a Double O-Z plasty (double transposition flap) was deemed most appropriate.  Using a sterile surgical marker, the appropriate transposition flaps were drawn incorporating the defect and placing the expected incisions within the relaxed skin tension lines where possible. The area thus outlined was incised deep to adipose tissue with a #15 scalpel blade.  The skin margins were undermined to an appropriate distance in all directions utilizing iris scissors.  Hemostasis was achieved with electrocautery.  The flaps were then transposed into place, one clockwise and the other counterclockwise, and anchored with interrupted buried subcutaneous sutures.
O-Z Plasty Text: The defect edges were debeveled with a #15 scalpel blade.  Given the location of the defect, shape of the defect and the proximity to free margins an O-Z plasty (double transposition flap) was deemed most appropriate.  Using a sterile surgical marker, the appropriate transposition flaps were drawn incorporating the defect and placing the expected incisions within the relaxed skin tension lines where possible.    The area thus outlined was incised deep to adipose tissue with a #15 scalpel blade.  The skin margins were undermined to an appropriate distance in all directions utilizing iris scissors.  Hemostasis was achieved with electrocautery.  The flaps were then transposed into place, one clockwise and the other counterclockwise, and anchored with interrupted buried subcutaneous sutures.
Excision Method: Elliptical
Epidermal Autograft Text: The defect edges were debeveled with a #15 scalpel blade.  Given the location of the defect, shape of the defect and the proximity to free margins an epidermal autograft was deemed most appropriate.  Using a sterile surgical marker, the primary defect shape was transferred to the donor site. The epidermal graft was then harvested.  The skin graft was then placed in the primary defect and oriented appropriately.
Complex Repair And Skin Substitute Graft Text: The defect edges were debeveled with a #15 scalpel blade.  The primary defect was closed partially with a complex linear closure.  Given the location of the remaining defect, shape of the defect and the proximity to free margins a skin substitute graft was deemed most appropriate to repair the remaining defect.  The graft was trimmed to fit the size of the remaining defect.  The graft was then placed in the primary defect, oriented appropriately, and sutured into place.
Rhomboid Transposition Flap Text: The defect edges were debeveled with a #15 scalpel blade.  Given the location of the defect and the proximity to free margins a rhomboid transposition flap was deemed most appropriate.  Using a sterile surgical marker, an appropriate rhomboid flap was drawn incorporating the defect.    The area thus outlined was incised deep to adipose tissue with a #15 scalpel blade.  The skin margins were undermined to an appropriate distance in all directions utilizing iris scissors.
Anesthesia Volume In Cc: 15
Complex Repair And Transposition Flap Text: The defect edges were debeveled with a #15 scalpel blade.  The primary defect was closed partially with a complex linear closure.  Given the location of the remaining defect, shape of the defect and the proximity to free margins a transposition flap was deemed most appropriate for complete closure of the defect.  Using a sterile surgical marker, an appropriate advancement flap was drawn incorporating the defect and placing the expected incisions within the relaxed skin tension lines where possible.    The area thus outlined was incised deep to adipose tissue with a #15 scalpel blade.  The skin margins were undermined to an appropriate distance in all directions utilizing iris scissors.
S Plasty Text: Given the location and shape of the defect, and the orientation of relaxed skin tension lines, an S-plasty was deemed most appropriate for repair.  Using a sterile surgical marker, the appropriate outline of the S-plasty was drawn, incorporating the defect and placing the expected incisions within the relaxed skin tension lines where possible.  The area thus outlined was incised deep to adipose tissue with a #15 scalpel blade.  The skin margins were undermined to an appropriate distance in all directions utilizing iris scissors. The skin flaps were advanced over the defect.  The opposing margins were then approximated with interrupted buried subcutaneous sutures.
Skin Substitute Text: The defect edges were debeveled with a #15 scalpel blade.  Given the location of the defect, shape of the defect and the proximity to free margins a skin substitute graft was deemed most appropriate.  The graft material was trimmed to fit the size of the defect. The graft was then placed in the primary defect and oriented appropriately.
Dermal Autograft Text: The defect edges were debeveled with a #15 scalpel blade.  Given the location of the defect, shape of the defect and the proximity to free margins a dermal autograft was deemed most appropriate.  Using a sterile surgical marker, the primary defect shape was transferred to the donor site. The area thus outlined was incised deep to adipose tissue with a #15 scalpel blade.  The harvested graft was then trimmed of adipose and epidermal tissue until only dermis was left.  The skin graft was then placed in the primary defect and oriented appropriately.
Epidermal Sutures: 4-0 Ethilon
Suture Removal: 14 days
Crescentic Advancement Flap Text: The defect edges were debeveled with a #15 scalpel blade.  Given the location of the defect and the proximity to free margins a crescentic advancement flap was deemed most appropriate.  Using a sterile surgical marker, the appropriate advancement flap was drawn incorporating the defect and placing the expected incisions within the relaxed skin tension lines where possible.    The area thus outlined was incised deep to adipose tissue with a #15 scalpel blade.  The skin margins were undermined to an appropriate distance in all directions utilizing iris scissors.
Path Notes (To The Dermatopathologist): Please check margins.\\n
V-Y Plasty Text: The defect edges were debeveled with a #15 scalpel blade.  Given the location of the defect, shape of the defect and the proximity to free margins an V-Y advancement flap was deemed most appropriate.  Using a sterile surgical marker, an appropriate advancement flap was drawn incorporating the defect and placing the expected incisions within the relaxed skin tension lines where possible.    The area thus outlined was incised deep to adipose tissue with a #15 scalpel blade.  The skin margins were undermined to an appropriate distance in all directions utilizing iris scissors.
Tissue Cultured Epidermal Autograft Text: The defect edges were debeveled with a #15 scalpel blade.  Given the location of the defect, shape of the defect and the proximity to free margins a tissue cultured epidermal autograft was deemed most appropriate.  The graft was then trimmed to fit the size of the defect.  The graft was then placed in the primary defect and oriented appropriately.
Epidermal Closure: running
A-T Advancement Flap Text: The defect edges were debeveled with a #15 scalpel blade.  Given the location of the defect, shape of the defect and the proximity to free margins an A-T advancement flap was deemed most appropriate.  Using a sterile surgical marker, an appropriate advancement flap was drawn incorporating the defect and placing the expected incisions within the relaxed skin tension lines where possible.    The area thus outlined was incised deep to adipose tissue with a #15 scalpel blade.  The skin margins were undermined to an appropriate distance in all directions utilizing iris scissors.
Bi-Rhombic Flap Text: The defect edges were debeveled with a #15 scalpel blade.  Given the location of the defect and the proximity to free margins a bi-rhombic flap was deemed most appropriate.  Using a sterile surgical marker, an appropriate rhombic flap was drawn incorporating the defect. The area thus outlined was incised deep to adipose tissue with a #15 scalpel blade.  The skin margins were undermined to an appropriate distance in all directions utilizing iris scissors.
Complex Repair And V-Y Plasty Text: The defect edges were debeveled with a #15 scalpel blade.  The primary defect was closed partially with a complex linear closure.  Given the location of the remaining defect, shape of the defect and the proximity to free margins a V-Y plasty was deemed most appropriate for complete closure of the defect.  Using a sterile surgical marker, an appropriate advancement flap was drawn incorporating the defect and placing the expected incisions within the relaxed skin tension lines where possible.    The area thus outlined was incised deep to adipose tissue with a #15 scalpel blade.  The skin margins were undermined to an appropriate distance in all directions utilizing iris scissors.
Xenograft Text: The defect edges were debeveled with a #15 scalpel blade.  Given the location of the defect, shape of the defect and the proximity to free margins a xenograft was deemed most appropriate.  The graft was then trimmed to fit the size of the defect.  The graft was then placed in the primary defect and oriented appropriately.
Complex Repair And Double M Plasty Text: The defect edges were debeveled with a #15 scalpel blade.  The primary defect was closed partially with a complex linear closure.  Given the location of the remaining defect, shape of the defect and the proximity to free margins a double M plasty was deemed most appropriate for complete closure of the defect.  Using a sterile surgical marker, an appropriate advancement flap was drawn incorporating the defect and placing the expected incisions within the relaxed skin tension lines where possible.    The area thus outlined was incised deep to adipose tissue with a #15 scalpel blade.  The skin margins were undermined to an appropriate distance in all directions utilizing iris scissors.
O-L Flap Text: The defect edges were debeveled with a #15 scalpel blade.  Given the location of the defect, shape of the defect and the proximity to free margins an O-L flap was deemed most appropriate.  Using a sterile surgical marker, an appropriate advancement flap was drawn incorporating the defect and placing the expected incisions within the relaxed skin tension lines where possible.    The area thus outlined was incised deep to adipose tissue with a #15 scalpel blade.  The skin margins were undermined to an appropriate distance in all directions utilizing iris scissors.
Consent was obtained from the patient. The risks and benefits to therapy were discussed in detail. Specifically, the risks of infection, scarring, bleeding, prolonged wound healing, incomplete removal, allergy to anesthesia, nerve injury and recurrence were addressed. Prior to the procedure, the treatment site was clearly identified and confirmed by the patient. All components of Universal Protocol/PAUSE Rule completed.
Complex Repair And M Plasty Text: The defect edges were debeveled with a #15 scalpel blade.  The primary defect was closed partially with a complex linear closure.  Given the location of the remaining defect, shape of the defect and the proximity to free margins an M plasty was deemed most appropriate for complete closure of the defect.  Using a sterile surgical marker, an appropriate advancement flap was drawn incorporating the defect and placing the expected incisions within the relaxed skin tension lines where possible.    The area thus outlined was incised deep to adipose tissue with a #15 scalpel blade.  The skin margins were undermined to an appropriate distance in all directions utilizing iris scissors.
O-T Advancement Flap Text: The defect edges were debeveled with a #15 scalpel blade.  Given the location of the defect, shape of the defect and the proximity to free margins an O-T advancement flap was deemed most appropriate.  Using a sterile surgical marker, an appropriate advancement flap was drawn incorporating the defect and placing the expected incisions within the relaxed skin tension lines where possible.    The area thus outlined was incised deep to adipose tissue with a #15 scalpel blade.  The skin margins were undermined to an appropriate distance in all directions utilizing iris scissors.
Helical Rim Advancement Flap Text: The defect edges were debeveled with a #15 blade scalpel.  Given the location of the defect and the proximity to free margins (helical rim) a double helical rim advancement flap was deemed most appropriate.  Using a sterile surgical marker, the appropriate advancement flaps were drawn incorporating the defect and placing the expected incisions between the helical rim and antihelix where possible.  The area thus outlined was incised through and through with a #15 scalpel blade.  With a skin hook and iris scissors, the flaps were gently and sharply undermined and freed up.
H Plasty Text: Given the location of the defect, shape of the defect and the proximity to free margins a H-plasty was deemed most appropriate for repair.  Using a sterile surgical marker, the appropriate advancement arms of the H-plasty were drawn incorporating the defect and placing the expected incisions within the relaxed skin tension lines where possible. The area thus outlined was incised deep to adipose tissue with a #15 scalpel blade. The skin margins were undermined to an appropriate distance in all directions utilizing iris scissors.  The opposing advancement arms were then advanced into place in opposite direction and anchored with interrupted buried subcutaneous sutures.

## 2019-11-14 ENCOUNTER — APPOINTMENT (RX ONLY)
Dept: URBAN - METROPOLITAN AREA CLINIC 61 | Facility: CLINIC | Age: 69
Setting detail: DERMATOLOGY
End: 2019-11-14

## 2019-11-14 DIAGNOSIS — Z48.02 ENCOUNTER FOR REMOVAL OF SUTURES: ICD-10-CM

## 2019-11-14 PROCEDURE — ? SUTURE REMOVAL (GLOBAL PERIOD)

## 2019-11-14 PROCEDURE — ? COUNSELING

## 2019-11-14 PROCEDURE — 99024 POSTOP FOLLOW-UP VISIT: CPT

## 2019-11-14 ASSESSMENT — LOCATION DETAILED DESCRIPTION DERM: LOCATION DETAILED: RIGHT PROXIMAL LATERAL CALF

## 2019-11-14 ASSESSMENT — PAIN INTENSITY VAS: HOW INTENSE IS YOUR PAIN 0 BEING NO PAIN, 10 BEING THE MOST SEVERE PAIN POSSIBLE?: NO PAIN

## 2019-11-14 ASSESSMENT — LOCATION SIMPLE DESCRIPTION DERM: LOCATION SIMPLE: RIGHT CALF

## 2019-11-14 ASSESSMENT — LOCATION ZONE DERM: LOCATION ZONE: LEG

## 2019-11-14 NOTE — PROCEDURE: SUTURE REMOVAL (GLOBAL PERIOD)
Add 73948 Cpt? (Important Note: In 2017 The Use Of 05301 Is Being Tracked By Cms To Determine Future Global Period Reimbursement For Global Periods): yes
Detail Level: Detailed

## 2020-06-15 ENCOUNTER — APPOINTMENT (RX ONLY)
Dept: URBAN - METROPOLITAN AREA CLINIC 61 | Facility: CLINIC | Age: 70
Setting detail: DERMATOLOGY
End: 2020-06-15

## 2020-06-15 DIAGNOSIS — D485 NEOPLASM OF UNCERTAIN BEHAVIOR OF SKIN: ICD-10-CM

## 2020-06-15 DIAGNOSIS — D17 BENIGN LIPOMATOUS NEOPLASM: ICD-10-CM

## 2020-06-15 DIAGNOSIS — Z85.828 PERSONAL HISTORY OF OTHER MALIGNANT NEOPLASM OF SKIN: ICD-10-CM

## 2020-06-15 DIAGNOSIS — D22 MELANOCYTIC NEVI: ICD-10-CM

## 2020-06-15 DIAGNOSIS — L82.1 OTHER SEBORRHEIC KERATOSIS: ICD-10-CM

## 2020-06-15 PROBLEM — D17.21 BENIGN LIPOMATOUS NEOPLASM OF SKIN AND SUBCUTANEOUS TISSUE OF RIGHT ARM: Status: ACTIVE | Noted: 2020-06-15

## 2020-06-15 PROBLEM — D48.5 NEOPLASM OF UNCERTAIN BEHAVIOR OF SKIN: Status: ACTIVE | Noted: 2020-06-15

## 2020-06-15 PROBLEM — D22.5 MELANOCYTIC NEVI OF TRUNK: Status: ACTIVE | Noted: 2020-06-15

## 2020-06-15 PROCEDURE — 11102 TANGNTL BX SKIN SINGLE LES: CPT

## 2020-06-15 PROCEDURE — ? COUNSELING

## 2020-06-15 PROCEDURE — ? BIOPSY BY SHAVE METHOD

## 2020-06-15 PROCEDURE — ? DEFER

## 2020-06-15 PROCEDURE — 99213 OFFICE O/P EST LOW 20 MIN: CPT | Mod: 25

## 2020-06-15 ASSESSMENT — LOCATION DETAILED DESCRIPTION DERM
LOCATION DETAILED: RIGHT MID-UPPER BACK
LOCATION DETAILED: RIGHT SUPERIOR UPPER BACK
LOCATION DETAILED: RIGHT DISTAL LATERAL CALF
LOCATION DETAILED: LEFT MID-UPPER BACK
LOCATION DETAILED: RIGHT ANTECUBITAL SKIN

## 2020-06-15 ASSESSMENT — LOCATION SIMPLE DESCRIPTION DERM
LOCATION SIMPLE: RIGHT UPPER BACK
LOCATION SIMPLE: RIGHT ELBOW
LOCATION SIMPLE: RIGHT CALF
LOCATION SIMPLE: LEFT UPPER BACK

## 2020-06-15 ASSESSMENT — LOCATION ZONE DERM
LOCATION ZONE: LEG
LOCATION ZONE: TRUNK
LOCATION ZONE: ARM

## 2020-06-15 ASSESSMENT — PAIN INTENSITY VAS: HOW INTENSE IS YOUR PAIN 0 BEING NO PAIN, 10 BEING THE MOST SEVERE PAIN POSSIBLE?: NO PAIN

## 2020-06-15 NOTE — PROCEDURE: DEFER
Detail Level: Detailed
Procedure To Be Performed At Next Visit: Excision
Introduction Text (Please End With A Colon): Patient will contact office if he wishes to remove lipoma surgically.

## 2020-07-06 ENCOUNTER — APPOINTMENT (RX ONLY)
Dept: URBAN - METROPOLITAN AREA CLINIC 61 | Facility: CLINIC | Age: 70
Setting detail: DERMATOLOGY
End: 2020-07-06

## 2020-07-06 ENCOUNTER — APPOINTMENT (RX ONLY)
Dept: URBAN - METROPOLITAN AREA SURGERY CENTER 6 | Facility: SURGERY CENTER | Age: 70
Setting detail: DERMATOLOGY
End: 2020-07-06

## 2020-07-06 VITALS
OXYGEN SATURATION: 96 % | RESPIRATION RATE: 16 BRPM | HEART RATE: 100 BPM | DIASTOLIC BLOOD PRESSURE: 84 MMHG | SYSTOLIC BLOOD PRESSURE: 100 MMHG

## 2020-07-06 PROBLEM — D03.59 MELANOMA IN SITU OF OTHER PART OF TRUNK: Status: ACTIVE | Noted: 2020-07-06

## 2020-07-06 PROCEDURE — 11604 EXC TR-EXT MAL+MARG 3.1-4 CM: CPT

## 2020-07-06 PROCEDURE — ? PRESCRIPTION

## 2020-07-06 PROCEDURE — 13102 CMPLX RPR TRUNK ADDL 5CM/<: CPT

## 2020-07-06 PROCEDURE — 13101 CMPLX RPR TRUNK 2.6-7.5 CM: CPT

## 2020-07-06 PROCEDURE — ? EXCISION

## 2020-07-06 RX ORDER — CEPHALEXIN 500 MG/1
CAPSULE ORAL
Qty: 21 | Refills: 0 | Status: ERX | COMMUNITY
Start: 2020-07-06

## 2020-07-06 RX ADMIN — CEPHALEXIN: 500 CAPSULE ORAL at 00:00

## 2020-07-06 NOTE — PROCEDURE: EXCISION
Biopsy Photograph Reviewed: Yes
Size Of Lesion In Cm: 1.7
Size Of Margin In Cm: 1
Excision Method: Elliptical
Anesthesia Volume In Cc: 15
Did You Provide Opioid Counseling: No
Repair Type: Complex
Suturegard Retention Suture: 2-0 Nylon
Retention Suture Bite Size: 3 mm
Length To Time In Minutes Device Was In Place: 10
Intermediate / Complex Repair - Final Wound Length In Cm: 8
Distance Of Undermining In Cm (Required): 2
Undermining Type: Entire Wound
Debridement Text: The wound edges were debrided prior to proceeding with the closure to facilitate wound healing.
Helical Rim Text: The closure involved the helical rim.
Vermilion Border Text: The closure involved the vermilion border.
Nostril Rim Text: The closure involved the nostril rim.
Retention Suture Text: Retention sutures were placed to support the closure and prevent dehiscence.
Primary Defect Length (In Cm): 0
Suture Removal: 14 days
Lab: [id number]
Lab Facility: [id number]
Graft Donor Site Bandage (Optional-Leave Blank If You Don't Want In Note): Steri-strips and a pressure bandage were applied to the donor site.
Epidermal Closure Graft Donor Site (Optional): simple interrupted
Billing Type: Third-Party Bill
Excision Depth: adipose tissue
Scalpel Size: 15 blade
Anesthesia Type: 1% lidocaine without epinephrine
Hemostasis: Electrocautery
Estimated Blood Loss (Cc): minimal
Detail Level: Detailed
Anesthesia Type: 1% lidocaine with epinephrine
Deep Sutures: 4-0 Vicryl
Epidermal Sutures: 4-0 Ethilon
Epidermal Closure: running
Wound Care: Vaseline
Dressing: pressure dressing with telfa
Suturegard Intro: Intraoperative tissue expansion was performed, utilizing the SUTUREGARD device, in order to reduce wound tension.
Suturegard Body: The suture ends were repeatedly re-tightened and re-clamped to achieve the desired tissue expansion.
Hemigard Intro: Due to skin fragility and wound tension, it was decided to use HEMIGARD adhesive retention suture devices to permit a linear closure. The skin was cleaned and dried for a 6cm distance away from the wound. Excessive hair, if present, was removed to allow for adhesion.
Hemigard Postcare Instructions: The HEMIGARD strips are to remain completely dry for at least 5-7 days.
Positioning (Leave Blank If You Do Not Want): The patient was placed in a comfortable position exposing the surgical site.
Pre-Excision Curettage Text (Leave Blank If You Do Not Want): Prior to drawing the surgical margin the visible lesion was removed with electrodesiccation and curettage to clearly define the lesion size.
Complex Repair Preamble Text (Leave Blank If You Do Not Want): Extensive wide undermining was performed.
Intermediate Repair Preamble Text (Leave Blank If You Do Not Want): Undermining was performed with blunt dissection.
Curvilinear Excision Additional Text (Leave Blank If You Do Not Want): The margin was drawn around the clinically apparent lesion.  A curvilinear shape was then drawn on the skin incorporating the lesion and margins.  Incisions were then made along these lines to the appropriate tissue plane and the lesion was extirpated.
Fusiform Excision Additional Text (Leave Blank If You Do Not Want): The margin was drawn around the clinically apparent lesion.  A fusiform shape was then drawn on the skin incorporating the lesion and margins.  Incisions were then made along these lines to the appropriate tissue plane and the lesion was extirpated.
Elliptical Excision Additional Text (Leave Blank If You Do Not Want): The margin was drawn around the clinically apparent lesion.  An elliptical shape was then drawn on the skin incorporating the lesion and margins.  Incisions were then made along these lines to the appropriate tissue plane and the lesion was extirpated.
Saucerization Excision Additional Text (Leave Blank If You Do Not Want): The margin was drawn around the clinically apparent lesion.  Incisions were then made along these lines, in a tangential fashion, to the appropriate tissue plane and the lesion was extirpated.
Slit Excision Additional Text (Leave Blank If You Do Not Want): A linear line was drawn on the skin overlying the lesion. An incision was made slowly until the lesion was visualized.  Once visualized, the lesion was removed with blunt dissection.
Excisional Biopsy Additional Text (Leave Blank If You Do Not Want): The margin was drawn around the clinically apparent lesion.  An elliptical shape was then drawn on the skin incorporating the lesion and margins.  Incisions were then made along these lines to the appropriate tissue plane and the lesion was extirpated.
Perilesional Excision Additional Text (Leave Blank If You Do Not Want): The margin was drawn around the clinically apparent lesion. Incisions were then made along these lines to the appropriate tissue plane and the lesion was extirpated.
Repair Performed By Another Provider Text (Leave Blank If You Do Not Want): After the tissue was excised the defect was repaired by another provider.
No Repair - Repaired With Adjacent Surgical Defect Text (Leave Blank If You Do Not Want): After the excision the defect was repaired concurrently with another surgical defect which was in close approximation.
Advancement Flap (Single) Text: The defect edges were debeveled with a #15 scalpel blade.  Given the location of the defect and the proximity to free margins a single advancement flap was deemed most appropriate.  Using a sterile surgical marker, an appropriate advancement flap was drawn incorporating the defect and placing the expected incisions within the relaxed skin tension lines where possible.    The area thus outlined was incised deep to adipose tissue with a #15 scalpel blade.  The skin margins were undermined to an appropriate distance in all directions utilizing iris scissors.
Advancement Flap (Double) Text: The defect edges were debeveled with a #15 scalpel blade.  Given the location of the defect and the proximity to free margins a double advancement flap was deemed most appropriate.  Using a sterile surgical marker, the appropriate advancement flaps were drawn incorporating the defect and placing the expected incisions within the relaxed skin tension lines where possible.    The area thus outlined was incised deep to adipose tissue with a #15 scalpel blade.  The skin margins were undermined to an appropriate distance in all directions utilizing iris scissors.
Burow's Advancement Flap Text: The defect edges were debeveled with a #15 scalpel blade.  Given the location of the defect and the proximity to free margins a Burow's advancement flap was deemed most appropriate.  Using a sterile surgical marker, the appropriate advancement flap was drawn incorporating the defect and placing the expected incisions within the relaxed skin tension lines where possible.    The area thus outlined was incised deep to adipose tissue with a #15 scalpel blade.  The skin margins were undermined to an appropriate distance in all directions utilizing iris scissors.
Chonodrocutaneous Helical Advancement Flap Text: The defect edges were debeveled with a #15 scalpel blade.  Given the location of the defect and the proximity to free margins a chondrocutaneous helical advancement flap was deemed most appropriate.  Using a sterile surgical marker, the appropriate advancement flap was drawn incorporating the defect and placing the expected incisions within the relaxed skin tension lines where possible.    The area thus outlined was incised deep to adipose tissue with a #15 scalpel blade.  The skin margins were undermined to an appropriate distance in all directions utilizing iris scissors.
Crescentic Advancement Flap Text: The defect edges were debeveled with a #15 scalpel blade.  Given the location of the defect and the proximity to free margins a crescentic advancement flap was deemed most appropriate.  Using a sterile surgical marker, the appropriate advancement flap was drawn incorporating the defect and placing the expected incisions within the relaxed skin tension lines where possible.    The area thus outlined was incised deep to adipose tissue with a #15 scalpel blade.  The skin margins were undermined to an appropriate distance in all directions utilizing iris scissors.
A-T Advancement Flap Text: The defect edges were debeveled with a #15 scalpel blade.  Given the location of the defect, shape of the defect and the proximity to free margins an A-T advancement flap was deemed most appropriate.  Using a sterile surgical marker, an appropriate advancement flap was drawn incorporating the defect and placing the expected incisions within the relaxed skin tension lines where possible.    The area thus outlined was incised deep to adipose tissue with a #15 scalpel blade.  The skin margins were undermined to an appropriate distance in all directions utilizing iris scissors.
O-T Advancement Flap Text: The defect edges were debeveled with a #15 scalpel blade.  Given the location of the defect, shape of the defect and the proximity to free margins an O-T advancement flap was deemed most appropriate.  Using a sterile surgical marker, an appropriate advancement flap was drawn incorporating the defect and placing the expected incisions within the relaxed skin tension lines where possible.    The area thus outlined was incised deep to adipose tissue with a #15 scalpel blade.  The skin margins were undermined to an appropriate distance in all directions utilizing iris scissors.
O-L Flap Text: The defect edges were debeveled with a #15 scalpel blade.  Given the location of the defect, shape of the defect and the proximity to free margins an O-L flap was deemed most appropriate.  Using a sterile surgical marker, an appropriate advancement flap was drawn incorporating the defect and placing the expected incisions within the relaxed skin tension lines where possible.    The area thus outlined was incised deep to adipose tissue with a #15 scalpel blade.  The skin margins were undermined to an appropriate distance in all directions utilizing iris scissors.
O-Z Flap Text: The defect edges were debeveled with a #15 scalpel blade.  Given the location of the defect, shape of the defect and the proximity to free margins an O-Z flap was deemed most appropriate.  Using a sterile surgical marker, an appropriate transposition flap was drawn incorporating the defect and placing the expected incisions within the relaxed skin tension lines where possible. The area thus outlined was incised deep to adipose tissue with a #15 scalpel blade.  The skin margins were undermined to an appropriate distance in all directions utilizing iris scissors.
Double O-Z Flap Text: The defect edges were debeveled with a #15 scalpel blade.  Given the location of the defect, shape of the defect and the proximity to free margins a Double O-Z flap was deemed most appropriate.  Using a sterile surgical marker, an appropriate transposition flap was drawn incorporating the defect and placing the expected incisions within the relaxed skin tension lines where possible. The area thus outlined was incised deep to adipose tissue with a #15 scalpel blade.  The skin margins were undermined to an appropriate distance in all directions utilizing iris scissors.
V-Y Flap Text: The defect edges were debeveled with a #15 scalpel blade.  Given the location of the defect, shape of the defect and the proximity to free margins a V-Y flap was deemed most appropriate.  Using a sterile surgical marker, an appropriate advancement flap was drawn incorporating the defect and placing the expected incisions within the relaxed skin tension lines where possible.    The area thus outlined was incised deep to adipose tissue with a #15 scalpel blade.  The skin margins were undermined to an appropriate distance in all directions utilizing iris scissors.
Advancement-Rotation Flap Text: The defect edges were debeveled with a #15 scalpel blade.  Given the location of the defect, shape of the defect and the proximity to free margins an advancement-rotation flap was deemed most appropriate.  Using a sterile surgical marker, an appropriate flap was drawn incorporating the defect and placing the expected incisions within the relaxed skin tension lines where possible. The area thus outlined was incised deep to adipose tissue with a #15 scalpel blade.  The skin margins were undermined to an appropriate distance in all directions utilizing iris scissors.
Mercedes Flap Text: The defect edges were debeveled with a #15 scalpel blade.  Given the location of the defect, shape of the defect and the proximity to free margins a Mercedes flap was deemed most appropriate.  Using a sterile surgical marker, an appropriate advancement flap was drawn incorporating the defect and placing the expected incisions within the relaxed skin tension lines where possible. The area thus outlined was incised deep to adipose tissue with a #15 scalpel blade.  The skin margins were undermined to an appropriate distance in all directions utilizing iris scissors.
Modified Advancement Flap Text: The defect edges were debeveled with a #15 scalpel blade.  Given the location of the defect, shape of the defect and the proximity to free margins a modified advancement flap was deemed most appropriate.  Using a sterile surgical marker, an appropriate advancement flap was drawn incorporating the defect and placing the expected incisions within the relaxed skin tension lines where possible.    The area thus outlined was incised deep to adipose tissue with a #15 scalpel blade.  The skin margins were undermined to an appropriate distance in all directions utilizing iris scissors.
Mucosal Advancement Flap Text: Given the location of the defect, shape of the defect and the proximity to free margins a mucosal advancement flap was deemed most appropriate. Incisions were made with a 15 blade scalpel in the appropriate fashion along the cutaneous vermillion border and the mucosal lip. The remaining actinically damaged mucosal tissue was excised.  The mucosal advancement flap was then elevated to the gingival sulcus with care taken to preserve the neurovascular structures and advanced into the primary defect. Care was taken to ensure that precise realignment of the vermillion border was achieved.
Peng Advancement Flap Text: The defect edges were debeveled with a #15 scalpel blade.  Given the location of the defect, shape of the defect and the proximity to free margins a Peng advancement flap was deemed most appropriate.  Using a sterile surgical marker, an appropriate advancement flap was drawn incorporating the defect and placing the expected incisions within the relaxed skin tension lines where possible. The area thus outlined was incised deep to adipose tissue with a #15 scalpel blade.  The skin margins were undermined to an appropriate distance in all directions utilizing iris scissors.
Hatchet Flap Text: The defect edges were debeveled with a #15 scalpel blade.  Given the location of the defect, shape of the defect and the proximity to free margins a hatchet flap was deemed most appropriate.  Using a sterile surgical marker, an appropriate hatchet flap was drawn incorporating the defect and placing the expected incisions within the relaxed skin tension lines where possible.    The area thus outlined was incised deep to adipose tissue with a #15 scalpel blade.  The skin margins were undermined to an appropriate distance in all directions utilizing iris scissors.
Rotation Flap Text: The defect edges were debeveled with a #15 scalpel blade.  Given the location of the defect, shape of the defect and the proximity to free margins a rotation flap was deemed most appropriate.  Using a sterile surgical marker, an appropriate rotation flap was drawn incorporating the defect and placing the expected incisions within the relaxed skin tension lines where possible.    The area thus outlined was incised deep to adipose tissue with a #15 scalpel blade.  The skin margins were undermined to an appropriate distance in all directions utilizing iris scissors.
Spiral Flap Text: The defect edges were debeveled with a #15 scalpel blade.  Given the location of the defect, shape of the defect and the proximity to free margins a spiral flap was deemed most appropriate.  Using a sterile surgical marker, an appropriate rotation flap was drawn incorporating the defect and placing the expected incisions within the relaxed skin tension lines where possible. The area thus outlined was incised deep to adipose tissue with a #15 scalpel blade.  The skin margins were undermined to an appropriate distance in all directions utilizing iris scissors.
Star Wedge Flap Text: The defect edges were debeveled with a #15 scalpel blade.  Given the location of the defect, shape of the defect and the proximity to free margins a star wedge flap was deemed most appropriate.  Using a sterile surgical marker, an appropriate rotation flap was drawn incorporating the defect and placing the expected incisions within the relaxed skin tension lines where possible. The area thus outlined was incised deep to adipose tissue with a #15 scalpel blade.  The skin margins were undermined to an appropriate distance in all directions utilizing iris scissors.
Transposition Flap Text: The defect edges were debeveled with a #15 scalpel blade.  Given the location of the defect and the proximity to free margins a transposition flap was deemed most appropriate.  Using a sterile surgical marker, an appropriate transposition flap was drawn incorporating the defect.    The area thus outlined was incised deep to adipose tissue with a #15 scalpel blade.  The skin margins were undermined to an appropriate distance in all directions utilizing iris scissors.
Muscle Hinge Flap Text: The defect edges were debeveled with a #15 scalpel blade.  Given the size, depth and location of the defect and the proximity to free margins a muscle hinge flap was deemed most appropriate.  Using a sterile surgical marker, an appropriate hinge flap was drawn incorporating the defect. The area thus outlined was incised with a #15 scalpel blade.  The skin margins were undermined to an appropriate distance in all directions utilizing iris scissors.
Melolabial Transposition Flap Text: The defect edges were debeveled with a #15 scalpel blade.  Given the location of the defect and the proximity to free margins a melolabial flap was deemed most appropriate.  Using a sterile surgical marker, an appropriate melolabial transposition flap was drawn incorporating the defect.    The area thus outlined was incised deep to adipose tissue with a #15 scalpel blade.  The skin margins were undermined to an appropriate distance in all directions utilizing iris scissors.
Rhombic Flap Text: The defect edges were debeveled with a #15 scalpel blade.  Given the location of the defect and the proximity to free margins a rhombic flap was deemed most appropriate.  Using a sterile surgical marker, an appropriate rhombic flap was drawn incorporating the defect.    The area thus outlined was incised deep to adipose tissue with a #15 scalpel blade.  The skin margins were undermined to an appropriate distance in all directions utilizing iris scissors.
Rhomboid Transposition Flap Text: The defect edges were debeveled with a #15 scalpel blade.  Given the location of the defect and the proximity to free margins a rhomboid transposition flap was deemed most appropriate.  Using a sterile surgical marker, an appropriate rhomboid flap was drawn incorporating the defect.    The area thus outlined was incised deep to adipose tissue with a #15 scalpel blade.  The skin margins were undermined to an appropriate distance in all directions utilizing iris scissors.
Bi-Rhombic Flap Text: The defect edges were debeveled with a #15 scalpel blade.  Given the location of the defect and the proximity to free margins a bi-rhombic flap was deemed most appropriate.  Using a sterile surgical marker, an appropriate rhombic flap was drawn incorporating the defect. The area thus outlined was incised deep to adipose tissue with a #15 scalpel blade.  The skin margins were undermined to an appropriate distance in all directions utilizing iris scissors.
Helical Rim Advancement Flap Text: The defect edges were debeveled with a #15 blade scalpel.  Given the location of the defect and the proximity to free margins (helical rim) a double helical rim advancement flap was deemed most appropriate.  Using a sterile surgical marker, the appropriate advancement flaps were drawn incorporating the defect and placing the expected incisions between the helical rim and antihelix where possible.  The area thus outlined was incised through and through with a #15 scalpel blade.  With a skin hook and iris scissors, the flaps were gently and sharply undermined and freed up.
Bilateral Helical Rim Advancement Flap Text: The defect edges were debeveled with a #15 blade scalpel.  Given the location of the defect and the proximity to free margins (helical rim) a bilateral helical rim advancement flap was deemed most appropriate.  Using a sterile surgical marker, the appropriate advancement flaps were drawn incorporating the defect and placing the expected incisions between the helical rim and antihelix where possible.  The area thus outlined was incised through and through with a #15 scalpel blade.  With a skin hook and iris scissors, the flaps were gently and sharply undermined and freed up.
Ear Star Wedge Flap Text: The defect edges were debeveled with a #15 blade scalpel.  Given the location of the defect and the proximity to free margins (helical rim) an ear star wedge flap was deemed most appropriate.  Using a sterile surgical marker, the appropriate flap was drawn incorporating the defect and placing the expected incisions between the helical rim and antihelix where possible.  The area thus outlined was incised through and through with a #15 scalpel blade.
Banner Transposition Flap Text: The defect edges were debeveled with a #15 scalpel blade.  Given the location of the defect and the proximity to free margins a Banner transposition flap was deemed most appropriate.  Using a sterile surgical marker, an appropriate flap drawn around the defect. The area thus outlined was incised deep to adipose tissue with a #15 scalpel blade.  The skin margins were undermined to an appropriate distance in all directions utilizing iris scissors.
Bilobed Flap Text: The defect edges were debeveled with a #15 scalpel blade.  Given the location of the defect and the proximity to free margins a bilobe flap was deemed most appropriate.  Using a sterile surgical marker, an appropriate bilobe flap drawn around the defect.    The area thus outlined was incised deep to adipose tissue with a #15 scalpel blade.  The skin margins were undermined to an appropriate distance in all directions utilizing iris scissors.
Bilobed Transposition Flap Text: The defect edges were debeveled with a #15 scalpel blade.  Given the location of the defect and the proximity to free margins a bilobed transposition flap was deemed most appropriate.  Using a sterile surgical marker, an appropriate bilobe flap drawn around the defect.    The area thus outlined was incised deep to adipose tissue with a #15 scalpel blade.  The skin margins were undermined to an appropriate distance in all directions utilizing iris scissors.
Trilobed Flap Text: The defect edges were debeveled with a #15 scalpel blade.  Given the location of the defect and the proximity to free margins a trilobed flap was deemed most appropriate.  Using a sterile surgical marker, an appropriate trilobed flap drawn around the defect.    The area thus outlined was incised deep to adipose tissue with a #15 scalpel blade.  The skin margins were undermined to an appropriate distance in all directions utilizing iris scissors.
Dorsal Nasal Flap Text: The defect edges were debeveled with a #15 scalpel blade.  Given the location of the defect and the proximity to free margins a dorsal nasal flap was deemed most appropriate.  Using a sterile surgical marker, an appropriate dorsal nasal flap was drawn around the defect.    The area thus outlined was incised deep to adipose tissue with a #15 scalpel blade.  The skin margins were undermined to an appropriate distance in all directions utilizing iris scissors.
Island Pedicle Flap Text: The defect edges were debeveled with a #15 scalpel blade.  Given the location of the defect, shape of the defect and the proximity to free margins an island pedicle advancement flap was deemed most appropriate.  Using a sterile surgical marker, an appropriate advancement flap was drawn incorporating the defect, outlining the appropriate donor tissue and placing the expected incisions within the relaxed skin tension lines where possible.    The area thus outlined was incised deep to adipose tissue with a #15 scalpel blade.  The skin margins were undermined to an appropriate distance in all directions around the primary defect and laterally outward around the island pedicle utilizing iris scissors.  There was minimal undermining beneath the pedicle flap.
Island Pedicle Flap With Canthal Suspension Text: The defect edges were debeveled with a #15 scalpel blade.  Given the location of the defect, shape of the defect and the proximity to free margins an island pedicle advancement flap was deemed most appropriate.  Using a sterile surgical marker, an appropriate advancement flap was drawn incorporating the defect, outlining the appropriate donor tissue and placing the expected incisions within the relaxed skin tension lines where possible. The area thus outlined was incised deep to adipose tissue with a #15 scalpel blade.  The skin margins were undermined to an appropriate distance in all directions around the primary defect and laterally outward around the island pedicle utilizing iris scissors.  There was minimal undermining beneath the pedicle flap. A suspension suture was placed in the canthal tendon to prevent tension and prevent ectropion.
Alar Island Pedicle Flap Text: The defect edges were debeveled with a #15 scalpel blade.  Given the location of the defect, shape of the defect and the proximity to the alar rim an island pedicle advancement flap was deemed most appropriate.  Using a sterile surgical marker, an appropriate advancement flap was drawn incorporating the defect, outlining the appropriate donor tissue and placing the expected incisions within the nasal ala running parallel to the alar rim. The area thus outlined was incised with a #15 scalpel blade.  The skin margins were undermined minimally to an appropriate distance in all directions around the primary defect and laterally outward around the island pedicle utilizing iris scissors.  There was minimal undermining beneath the pedicle flap.
Double Island Pedicle Flap Text: The defect edges were debeveled with a #15 scalpel blade.  Given the location of the defect, shape of the defect and the proximity to free margins a double island pedicle advancement flap was deemed most appropriate.  Using a sterile surgical marker, an appropriate advancement flap was drawn incorporating the defect, outlining the appropriate donor tissue and placing the expected incisions within the relaxed skin tension lines where possible.    The area thus outlined was incised deep to adipose tissue with a #15 scalpel blade.  The skin margins were undermined to an appropriate distance in all directions around the primary defect and laterally outward around the island pedicle utilizing iris scissors.  There was minimal undermining beneath the pedicle flap.
Island Pedicle Flap-Requiring Vessel Identification Text: The defect edges were debeveled with a #15 scalpel blade.  Given the location of the defect, shape of the defect and the proximity to free margins an island pedicle advancement flap was deemed most appropriate.  Using a sterile surgical marker, an appropriate advancement flap was drawn, based on the axial vessel mentioned above, incorporating the defect, outlining the appropriate donor tissue and placing the expected incisions within the relaxed skin tension lines where possible.    The area thus outlined was incised deep to adipose tissue with a #15 scalpel blade.  The skin margins were undermined to an appropriate distance in all directions around the primary defect and laterally outward around the island pedicle utilizing iris scissors.  There was minimal undermining beneath the pedicle flap.
Keystone Flap Text: The defect edges were debeveled with a #15 scalpel blade.  Given the location of the defect, shape of the defect a keystone flap was deemed most appropriate.  Using a sterile surgical marker, an appropriate keystone flap was drawn incorporating the defect, outlining the appropriate donor tissue and placing the expected incisions within the relaxed skin tension lines where possible. The area thus outlined was incised deep to adipose tissue with a #15 scalpel blade.  The skin margins were undermined to an appropriate distance in all directions around the primary defect and laterally outward around the flap utilizing iris scissors.
O-T Plasty Text: The defect edges were debeveled with a #15 scalpel blade.  Given the location of the defect, shape of the defect and the proximity to free margins an O-T plasty was deemed most appropriate.  Using a sterile surgical marker, an appropriate O-T plasty was drawn incorporating the defect and placing the expected incisions within the relaxed skin tension lines where possible.    The area thus outlined was incised deep to adipose tissue with a #15 scalpel blade.  The skin margins were undermined to an appropriate distance in all directions utilizing iris scissors.
O-Z Plasty Text: The defect edges were debeveled with a #15 scalpel blade.  Given the location of the defect, shape of the defect and the proximity to free margins an O-Z plasty (double transposition flap) was deemed most appropriate.  Using a sterile surgical marker, the appropriate transposition flaps were drawn incorporating the defect and placing the expected incisions within the relaxed skin tension lines where possible.    The area thus outlined was incised deep to adipose tissue with a #15 scalpel blade.  The skin margins were undermined to an appropriate distance in all directions utilizing iris scissors.  Hemostasis was achieved with electrocautery.  The flaps were then transposed into place, one clockwise and the other counterclockwise, and anchored with interrupted buried subcutaneous sutures.
Double O-Z Plasty Text: The defect edges were debeveled with a #15 scalpel blade.  Given the location of the defect, shape of the defect and the proximity to free margins a Double O-Z plasty (double transposition flap) was deemed most appropriate.  Using a sterile surgical marker, the appropriate transposition flaps were drawn incorporating the defect and placing the expected incisions within the relaxed skin tension lines where possible. The area thus outlined was incised deep to adipose tissue with a #15 scalpel blade.  The skin margins were undermined to an appropriate distance in all directions utilizing iris scissors.  Hemostasis was achieved with electrocautery.  The flaps were then transposed into place, one clockwise and the other counterclockwise, and anchored with interrupted buried subcutaneous sutures.
V-Y Plasty Text: The defect edges were debeveled with a #15 scalpel blade.  Given the location of the defect, shape of the defect and the proximity to free margins an V-Y advancement flap was deemed most appropriate.  Using a sterile surgical marker, an appropriate advancement flap was drawn incorporating the defect and placing the expected incisions within the relaxed skin tension lines where possible.    The area thus outlined was incised deep to adipose tissue with a #15 scalpel blade.  The skin margins were undermined to an appropriate distance in all directions utilizing iris scissors.
H Plasty Text: Given the location of the defect, shape of the defect and the proximity to free margins a H-plasty was deemed most appropriate for repair.  Using a sterile surgical marker, the appropriate advancement arms of the H-plasty were drawn incorporating the defect and placing the expected incisions within the relaxed skin tension lines where possible. The area thus outlined was incised deep to adipose tissue with a #15 scalpel blade. The skin margins were undermined to an appropriate distance in all directions utilizing iris scissors.  The opposing advancement arms were then advanced into place in opposite direction and anchored with interrupted buried subcutaneous sutures.
W Plasty Text: The lesion was extirpated to the level of the fat with a #15 scalpel blade.  Given the location of the defect, shape of the defect and the proximity to free margins a W-plasty was deemed most appropriate for repair.  Using a sterile surgical marker, the appropriate transposition arms of the W-plasty were drawn incorporating the defect and placing the expected incisions within the relaxed skin tension lines where possible.    The area thus outlined was incised deep to adipose tissue with a #15 scalpel blade.  The skin margins were undermined to an appropriate distance in all directions utilizing iris scissors.  The opposing transposition arms were then transposed into place in opposite direction and anchored with interrupted buried subcutaneous sutures.
Z Plasty Text: The lesion was extirpated to the level of the fat with a #15 scalpel blade.  Given the location of the defect, shape of the defect and the proximity to free margins a Z-plasty was deemed most appropriate for repair.  Using a sterile surgical marker, the appropriate transposition arms of the Z-plasty were drawn incorporating the defect and placing the expected incisions within the relaxed skin tension lines where possible.    The area thus outlined was incised deep to adipose tissue with a #15 scalpel blade.  The skin margins were undermined to an appropriate distance in all directions utilizing iris scissors.  The opposing transposition arms were then transposed into place in opposite direction and anchored with interrupted buried subcutaneous sutures.
Cheek Interpolation Flap Text: A decision was made to reconstruct the defect utilizing an interpolation axial flap and a staged reconstruction.  A telfa template was made of the defect.  This telfa template was then used to outline the Cheek Interpolation flap.  The donor area for the pedicle flap was then injected with anesthesia.  The flap was excised through the skin and subcutaneous tissue down to the layer of the underlying musculature.  The interpolation flap was carefully excised within this deep plane to maintain its blood supply.  The edges of the donor site were undermined.   The donor site was closed in a primary fashion.  The pedicle was then rotated into position and sutured.  Once the tube was sutured into place, adequate blood supply was confirmed with blanching and refill.  The pedicle was then wrapped with xeroform gauze and dressed appropriately with a telfa and gauze bandage to ensure continued blood supply and protect the attached pedicle.
Cheek-To-Nose Interpolation Flap Text: A decision was made to reconstruct the defect utilizing an interpolation axial flap and a staged reconstruction.  A telfa template was made of the defect.  This telfa template was then used to outline the Cheek-To-Nose Interpolation flap.  The donor area for the pedicle flap was then injected with anesthesia.  The flap was excised through the skin and subcutaneous tissue down to the layer of the underlying musculature.  The interpolation flap was carefully excised within this deep plane to maintain its blood supply.  The edges of the donor site were undermined.   The donor site was closed in a primary fashion.  The pedicle was then rotated into position and sutured.  Once the tube was sutured into place, adequate blood supply was confirmed with blanching and refill.  The pedicle was then wrapped with xeroform gauze and dressed appropriately with a telfa and gauze bandage to ensure continued blood supply and protect the attached pedicle.
Interpolation Flap Text: A decision was made to reconstruct the defect utilizing an interpolation axial flap and a staged reconstruction.  A telfa template was made of the defect.  This telfa template was then used to outline the interpolation flap.  The donor area for the pedicle flap was then injected with anesthesia.  The flap was excised through the skin and subcutaneous tissue down to the layer of the underlying musculature.  The interpolation flap was carefully excised within this deep plane to maintain its blood supply.  The edges of the donor site were undermined.   The donor site was closed in a primary fashion.  The pedicle was then rotated into position and sutured.  Once the tube was sutured into place, adequate blood supply was confirmed with blanching and refill.  The pedicle was then wrapped with xeroform gauze and dressed appropriately with a telfa and gauze bandage to ensure continued blood supply and protect the attached pedicle.
Melolabial Interpolation Flap Text: A decision was made to reconstruct the defect utilizing an interpolation axial flap and a staged reconstruction.  A telfa template was made of the defect.  This telfa template was then used to outline the melolabial interpolation flap.  The donor area for the pedicle flap was then injected with anesthesia.  The flap was excised through the skin and subcutaneous tissue down to the layer of the underlying musculature.  The pedicle flap was carefully excised within this deep plane to maintain its blood supply.  The edges of the donor site were undermined.   The donor site was closed in a primary fashion.  The pedicle was then rotated into position and sutured.  Once the tube was sutured into place, adequate blood supply was confirmed with blanching and refill.  The pedicle was then wrapped with xeroform gauze and dressed appropriately with a telfa and gauze bandage to ensure continued blood supply and protect the attached pedicle.
Mastoid Interpolation Flap Text: A decision was made to reconstruct the defect utilizing an interpolation axial flap and a staged reconstruction.  A telfa template was made of the defect.  This telfa template was then used to outline the mastoid interpolation flap.  The donor area for the pedicle flap was then injected with anesthesia.  The flap was excised through the skin and subcutaneous tissue down to the layer of the underlying musculature.  The pedicle flap was carefully excised within this deep plane to maintain its blood supply.  The edges of the donor site were undermined.   The donor site was closed in a primary fashion.  The pedicle was then rotated into position and sutured.  Once the tube was sutured into place, adequate blood supply was confirmed with blanching and refill.  The pedicle was then wrapped with xeroform gauze and dressed appropriately with a telfa and gauze bandage to ensure continued blood supply and protect the attached pedicle.
Posterior Auricular Interpolation Flap Text: A decision was made to reconstruct the defect utilizing an interpolation axial flap and a staged reconstruction.  A telfa template was made of the defect.  This telfa template was then used to outline the posterior auricular interpolation flap.  The donor area for the pedicle flap was then injected with anesthesia.  The flap was excised through the skin and subcutaneous tissue down to the layer of the underlying musculature.  The pedicle flap was carefully excised within this deep plane to maintain its blood supply.  The edges of the donor site were undermined.   The donor site was closed in a primary fashion.  The pedicle was then rotated into position and sutured.  Once the tube was sutured into place, adequate blood supply was confirmed with blanching and refill.  The pedicle was then wrapped with xeroform gauze and dressed appropriately with a telfa and gauze bandage to ensure continued blood supply and protect the attached pedicle.
Paramedian Forehead Flap Text: A decision was made to reconstruct the defect utilizing an interpolation axial flap and a staged reconstruction.  A telfa template was made of the defect.  This telfa template was then used to outline the paramedian forehead pedicle flap.  The donor area for the pedicle flap was then injected with anesthesia.  The flap was excised through the skin and subcutaneous tissue down to the layer of the underlying musculature.  The pedicle flap was carefully excised within this deep plane to maintain its blood supply.  The edges of the donor site were undermined.   The donor site was closed in a primary fashion.  The pedicle was then rotated into position and sutured.  Once the tube was sutured into place, adequate blood supply was confirmed with blanching and refill.  The pedicle was then wrapped with xeroform gauze and dressed appropriately with a telfa and gauze bandage to ensure continued blood supply and protect the attached pedicle.
Lip Wedge Excision Repair Text: Given the location of the defect and the proximity to free margins a full thickness wedge repair was deemed most appropriate.  Using a sterile surgical marker, the appropriate repair was drawn incorporating the defect and placing the expected incisions perpendicular to the vermillion border.  The vermillion border was also meticulously outlined to ensure appropriate reapproximation during the repair.  The area thus outlined was incised through and through with a #15 scalpel blade.  The muscularis and dermis were reaproximated with deep sutures following hemostasis. Care was taken to realign the vermillion border before proceeding with the superficial closure.  Once the vermillion was realigned the superfical and mucosal closure was finished.
Ftsg Text: The defect edges were debeveled with a #15 scalpel blade.  Given the location of the defect, shape of the defect and the proximity to free margins a full thickness skin graft was deemed most appropriate.  Using a sterile surgical marker, the primary defect shape was transferred to the donor site. The area thus outlined was incised deep to adipose tissue with a #15 scalpel blade.  The harvested graft was then trimmed of adipose tissue until only dermis and epidermis was left.  The skin margins of the secondary defect were undermined to an appropriate distance in all directions utilizing iris scissors.  The secondary defect was closed with interrupted buried subcutaneous sutures.  The skin edges were then re-apposed with running  sutures.  The skin graft was then placed in the primary defect and oriented appropriately.
Split-Thickness Skin Graft Text: The defect edges were debeveled with a #15 scalpel blade.  Given the location of the defect, shape of the defect and the proximity to free margins a split thickness skin graft was deemed most appropriate.  Using a sterile surgical marker, the primary defect shape was transferred to the donor site. The split thickness graft was then harvested.  The skin graft was then placed in the primary defect and oriented appropriately.
Burow's Graft Text: The defect edges were debeveled with a #15 scalpel blade.  Given the location of the defect, shape of the defect, the proximity to free margins and the presence of a standing cone deformity a Burow's skin graft was deemed most appropriate. The standing cone was removed and this tissue was then trimmed to the shape of the primary defect. The adipose tissue was also removed until only dermis and epidermis were left.  The skin margins of the secondary defect were undermined to an appropriate distance in all directions utilizing iris scissors.  The secondary defect was closed with interrupted buried subcutaneous sutures.  The skin edges were then re-apposed with running  sutures.  The skin graft was then placed in the primary defect and oriented appropriately.
Cartilage Graft Text: The defect edges were debeveled with a #15 scalpel blade.  Given the location of the defect, shape of the defect, the fact the defect involved a full thickness cartilage defect a cartilage graft was deemed most appropriate.  An appropriate donor site was identified, cleansed, and anesthetized. The cartilage graft was then harvested and transferred to the recipient site, oriented appropriately and then sutured into place.  The secondary defect was then repaired using a primary closure.
Composite Graft Text: The defect edges were debeveled with a #15 scalpel blade.  Given the location of the defect, shape of the defect, the proximity to free margins and the fact the defect was full thickness a composite graft was deemed most appropriate.  The defect was outline and then transferred to the donor site.  A full thickness graft was then excised from the donor site. The graft was then placed in the primary defect, oriented appropriately and then sutured into place.  The secondary defect was then repaired using a primary closure.
Epidermal Autograft Text: The defect edges were debeveled with a #15 scalpel blade.  Given the location of the defect, shape of the defect and the proximity to free margins an epidermal autograft was deemed most appropriate.  Using a sterile surgical marker, the primary defect shape was transferred to the donor site. The epidermal graft was then harvested.  The skin graft was then placed in the primary defect and oriented appropriately.
Dermal Autograft Text: The defect edges were debeveled with a #15 scalpel blade.  Given the location of the defect, shape of the defect and the proximity to free margins a dermal autograft was deemed most appropriate.  Using a sterile surgical marker, the primary defect shape was transferred to the donor site. The area thus outlined was incised deep to adipose tissue with a #15 scalpel blade.  The harvested graft was then trimmed of adipose and epidermal tissue until only dermis was left.  The skin graft was then placed in the primary defect and oriented appropriately.
Skin Substitute Text: The defect edges were debeveled with a #15 scalpel blade.  Given the location of the defect, shape of the defect and the proximity to free margins a skin substitute graft was deemed most appropriate.  The graft material was trimmed to fit the size of the defect. The graft was then placed in the primary defect and oriented appropriately.
Tissue Cultured Epidermal Autograft Text: The defect edges were debeveled with a #15 scalpel blade.  Given the location of the defect, shape of the defect and the proximity to free margins a tissue cultured epidermal autograft was deemed most appropriate.  The graft was then trimmed to fit the size of the defect.  The graft was then placed in the primary defect and oriented appropriately.
Xenograft Text: The defect edges were debeveled with a #15 scalpel blade.  Given the location of the defect, shape of the defect and the proximity to free margins a xenograft was deemed most appropriate.  The graft was then trimmed to fit the size of the defect.  The graft was then placed in the primary defect and oriented appropriately.
Purse String (Intermediate) Text: Given the location of the defect and the characteristics of the surrounding skin a pursestring intermediate closure was deemed most appropriate.  Undermining was performed circumfirentially around the surgical defect.  A purstring suture was then placed and tightened.
Purse String (Simple) Text: Given the location of the defect and the characteristics of the surrounding skin a purse string simple closure was deemed most appropriate.  Undermining was performed circumferentially around the surgical defect.  A purse string suture was then placed and tightened.
Partial Purse String (Intermediate) Text: Given the location of the defect and the characteristics of the surrounding skin an intermediate purse string closure was deemed most appropriate.  Undermining was performed circumferentially around the surgical defect.  A purse string suture was then placed and tightened. Wound tension of the circular defect prevented complete closure of the wound.
Partial Purse String (Simple) Text: Given the location of the defect and the characteristics of the surrounding skin a simple purse string closure was deemed most appropriate.  Undermining was performed circumferentially around the surgical defect.  A purse string suture was then placed and tightened. Wound tension of the circular defect prevented complete closure of the wound.
Complex Repair And Single Advancement Flap Text: The defect edges were debeveled with a #15 scalpel blade.  The primary defect was closed partially with a complex linear closure.  Given the location of the remaining defect, shape of the defect and the proximity to free margins a single advancement flap was deemed most appropriate for complete closure of the defect.  Using a sterile surgical marker, an appropriate advancement flap was drawn incorporating the defect and placing the expected incisions within the relaxed skin tension lines where possible.    The area thus outlined was incised deep to adipose tissue with a #15 scalpel blade.  The skin margins were undermined to an appropriate distance in all directions utilizing iris scissors.
Complex Repair And Double Advancement Flap Text: The defect edges were debeveled with a #15 scalpel blade.  The primary defect was closed partially with a complex linear closure.  Given the location of the remaining defect, shape of the defect and the proximity to free margins a double advancement flap was deemed most appropriate for complete closure of the defect.  Using a sterile surgical marker, an appropriate advancement flap was drawn incorporating the defect and placing the expected incisions within the relaxed skin tension lines where possible.    The area thus outlined was incised deep to adipose tissue with a #15 scalpel blade.  The skin margins were undermined to an appropriate distance in all directions utilizing iris scissors.
Complex Repair And Modified Advancement Flap Text: The defect edges were debeveled with a #15 scalpel blade.  The primary defect was closed partially with a complex linear closure.  Given the location of the remaining defect, shape of the defect and the proximity to free margins a modified advancement flap was deemed most appropriate for complete closure of the defect.  Using a sterile surgical marker, an appropriate advancement flap was drawn incorporating the defect and placing the expected incisions within the relaxed skin tension lines where possible.    The area thus outlined was incised deep to adipose tissue with a #15 scalpel blade.  The skin margins were undermined to an appropriate distance in all directions utilizing iris scissors.
Complex Repair And A-T Advancement Flap Text: The defect edges were debeveled with a #15 scalpel blade.  The primary defect was closed partially with a complex linear closure.  Given the location of the remaining defect, shape of the defect and the proximity to free margins an A-T advancement flap was deemed most appropriate for complete closure of the defect.  Using a sterile surgical marker, an appropriate advancement flap was drawn incorporating the defect and placing the expected incisions within the relaxed skin tension lines where possible.    The area thus outlined was incised deep to adipose tissue with a #15 scalpel blade.  The skin margins were undermined to an appropriate distance in all directions utilizing iris scissors.
Complex Repair And O-T Advancement Flap Text: The defect edges were debeveled with a #15 scalpel blade.  The primary defect was closed partially with a complex linear closure.  Given the location of the remaining defect, shape of the defect and the proximity to free margins an O-T advancement flap was deemed most appropriate for complete closure of the defect.  Using a sterile surgical marker, an appropriate advancement flap was drawn incorporating the defect and placing the expected incisions within the relaxed skin tension lines where possible.    The area thus outlined was incised deep to adipose tissue with a #15 scalpel blade.  The skin margins were undermined to an appropriate distance in all directions utilizing iris scissors.
Complex Repair And O-L Flap Text: The defect edges were debeveled with a #15 scalpel blade.  The primary defect was closed partially with a complex linear closure.  Given the location of the remaining defect, shape of the defect and the proximity to free margins an O-L flap was deemed most appropriate for complete closure of the defect.  Using a sterile surgical marker, an appropriate flap was drawn incorporating the defect and placing the expected incisions within the relaxed skin tension lines where possible.    The area thus outlined was incised deep to adipose tissue with a #15 scalpel blade.  The skin margins were undermined to an appropriate distance in all directions utilizing iris scissors.
Complex Repair And Bilobe Flap Text: The defect edges were debeveled with a #15 scalpel blade.  The primary defect was closed partially with a complex linear closure.  Given the location of the remaining defect, shape of the defect and the proximity to free margins a bilobe flap was deemed most appropriate for complete closure of the defect.  Using a sterile surgical marker, an appropriate advancement flap was drawn incorporating the defect and placing the expected incisions within the relaxed skin tension lines where possible.    The area thus outlined was incised deep to adipose tissue with a #15 scalpel blade.  The skin margins were undermined to an appropriate distance in all directions utilizing iris scissors.
Complex Repair And Melolabial Flap Text: The defect edges were debeveled with a #15 scalpel blade.  The primary defect was closed partially with a complex linear closure.  Given the location of the remaining defect, shape of the defect and the proximity to free margins a melolabial flap was deemed most appropriate for complete closure of the defect.  Using a sterile surgical marker, an appropriate advancement flap was drawn incorporating the defect and placing the expected incisions within the relaxed skin tension lines where possible.    The area thus outlined was incised deep to adipose tissue with a #15 scalpel blade.  The skin margins were undermined to an appropriate distance in all directions utilizing iris scissors.
Complex Repair And Rotation Flap Text: The defect edges were debeveled with a #15 scalpel blade.  The primary defect was closed partially with a complex linear closure.  Given the location of the remaining defect, shape of the defect and the proximity to free margins a rotation flap was deemed most appropriate for complete closure of the defect.  Using a sterile surgical marker, an appropriate advancement flap was drawn incorporating the defect and placing the expected incisions within the relaxed skin tension lines where possible.    The area thus outlined was incised deep to adipose tissue with a #15 scalpel blade.  The skin margins were undermined to an appropriate distance in all directions utilizing iris scissors.
Complex Repair And Rhombic Flap Text: The defect edges were debeveled with a #15 scalpel blade.  The primary defect was closed partially with a complex linear closure.  Given the location of the remaining defect, shape of the defect and the proximity to free margins a rhombic flap was deemed most appropriate for complete closure of the defect.  Using a sterile surgical marker, an appropriate advancement flap was drawn incorporating the defect and placing the expected incisions within the relaxed skin tension lines where possible.    The area thus outlined was incised deep to adipose tissue with a #15 scalpel blade.  The skin margins were undermined to an appropriate distance in all directions utilizing iris scissors.
Complex Repair And Transposition Flap Text: The defect edges were debeveled with a #15 scalpel blade.  The primary defect was closed partially with a complex linear closure.  Given the location of the remaining defect, shape of the defect and the proximity to free margins a transposition flap was deemed most appropriate for complete closure of the defect.  Using a sterile surgical marker, an appropriate advancement flap was drawn incorporating the defect and placing the expected incisions within the relaxed skin tension lines where possible.    The area thus outlined was incised deep to adipose tissue with a #15 scalpel blade.  The skin margins were undermined to an appropriate distance in all directions utilizing iris scissors.
Complex Repair And V-Y Plasty Text: The defect edges were debeveled with a #15 scalpel blade.  The primary defect was closed partially with a complex linear closure.  Given the location of the remaining defect, shape of the defect and the proximity to free margins a V-Y plasty was deemed most appropriate for complete closure of the defect.  Using a sterile surgical marker, an appropriate advancement flap was drawn incorporating the defect and placing the expected incisions within the relaxed skin tension lines where possible.    The area thus outlined was incised deep to adipose tissue with a #15 scalpel blade.  The skin margins were undermined to an appropriate distance in all directions utilizing iris scissors.
Complex Repair And M Plasty Text: The defect edges were debeveled with a #15 scalpel blade.  The primary defect was closed partially with a complex linear closure.  Given the location of the remaining defect, shape of the defect and the proximity to free margins an M plasty was deemed most appropriate for complete closure of the defect.  Using a sterile surgical marker, an appropriate advancement flap was drawn incorporating the defect and placing the expected incisions within the relaxed skin tension lines where possible.    The area thus outlined was incised deep to adipose tissue with a #15 scalpel blade.  The skin margins were undermined to an appropriate distance in all directions utilizing iris scissors.
Complex Repair And Double M Plasty Text: The defect edges were debeveled with a #15 scalpel blade.  The primary defect was closed partially with a complex linear closure.  Given the location of the remaining defect, shape of the defect and the proximity to free margins a double M plasty was deemed most appropriate for complete closure of the defect.  Using a sterile surgical marker, an appropriate advancement flap was drawn incorporating the defect and placing the expected incisions within the relaxed skin tension lines where possible.    The area thus outlined was incised deep to adipose tissue with a #15 scalpel blade.  The skin margins were undermined to an appropriate distance in all directions utilizing iris scissors.
Complex Repair And W Plasty Text: The defect edges were debeveled with a #15 scalpel blade.  The primary defect was closed partially with a complex linear closure.  Given the location of the remaining defect, shape of the defect and the proximity to free margins a W plasty was deemed most appropriate for complete closure of the defect.  Using a sterile surgical marker, an appropriate advancement flap was drawn incorporating the defect and placing the expected incisions within the relaxed skin tension lines where possible.    The area thus outlined was incised deep to adipose tissue with a #15 scalpel blade.  The skin margins were undermined to an appropriate distance in all directions utilizing iris scissors.
Complex Repair And Z Plasty Text: The defect edges were debeveled with a #15 scalpel blade.  The primary defect was closed partially with a complex linear closure.  Given the location of the remaining defect, shape of the defect and the proximity to free margins a Z plasty was deemed most appropriate for complete closure of the defect.  Using a sterile surgical marker, an appropriate advancement flap was drawn incorporating the defect and placing the expected incisions within the relaxed skin tension lines where possible.    The area thus outlined was incised deep to adipose tissue with a #15 scalpel blade.  The skin margins were undermined to an appropriate distance in all directions utilizing iris scissors.
Complex Repair And Dorsal Nasal Flap Text: The defect edges were debeveled with a #15 scalpel blade.  The primary defect was closed partially with a complex linear closure.  Given the location of the remaining defect, shape of the defect and the proximity to free margins a dorsal nasal flap was deemed most appropriate for complete closure of the defect.  Using a sterile surgical marker, an appropriate flap was drawn incorporating the defect and placing the expected incisions within the relaxed skin tension lines where possible.    The area thus outlined was incised deep to adipose tissue with a #15 scalpel blade.  The skin margins were undermined to an appropriate distance in all directions utilizing iris scissors.
Complex Repair And Ftsg Text: The defect edges were debeveled with a #15 scalpel blade.  The primary defect was closed partially with a complex linear closure.  Given the location of the defect, shape of the defect and the proximity to free margins a full thickness skin graft was deemed most appropriate to repair the remaining defect.  The graft was trimmed to fit the size of the remaining defect.  The graft was then placed in the primary defect, oriented appropriately, and sutured into place.
Complex Repair And Burow's Graft Text: The defect edges were debeveled with a #15 scalpel blade.  The primary defect was closed partially with a complex linear closure.  Given the location of the defect, shape of the defect, the proximity to free margins and the presence of a standing cone deformity a Burow's graft was deemed most appropriate to repair the remaining defect.  The graft was trimmed to fit the size of the remaining defect.  The graft was then placed in the primary defect, oriented appropriately, and sutured into place.
Complex Repair And Split-Thickness Skin Graft Text: The defect edges were debeveled with a #15 scalpel blade.  The primary defect was closed partially with a complex linear closure.  Given the location of the defect, shape of the defect and the proximity to free margins a split thickness skin graft was deemed most appropriate to repair the remaining defect.  The graft was trimmed to fit the size of the remaining defect.  The graft was then placed in the primary defect, oriented appropriately, and sutured into place.
Complex Repair And Epidermal Autograft Text: The defect edges were debeveled with a #15 scalpel blade.  The primary defect was closed partially with a complex linear closure.  Given the location of the defect, shape of the defect and the proximity to free margins an epidermal autograft was deemed most appropriate to repair the remaining defect.  The graft was trimmed to fit the size of the remaining defect.  The graft was then placed in the primary defect, oriented appropriately, and sutured into place.
Complex Repair And Dermal Autograft Text: The defect edges were debeveled with a #15 scalpel blade.  The primary defect was closed partially with a complex linear closure.  Given the location of the defect, shape of the defect and the proximity to free margins an dermal autograft was deemed most appropriate to repair the remaining defect.  The graft was trimmed to fit the size of the remaining defect.  The graft was then placed in the primary defect, oriented appropriately, and sutured into place.
Complex Repair And Tissue Cultured Epidermal Autograft Text: The defect edges were debeveled with a #15 scalpel blade.  The primary defect was closed partially with a complex linear closure.  Given the location of the defect, shape of the defect and the proximity to free margins an tissue cultured epidermal autograft was deemed most appropriate to repair the remaining defect.  The graft was trimmed to fit the size of the remaining defect.  The graft was then placed in the primary defect, oriented appropriately, and sutured into place.
Complex Repair And Xenograft Text: The defect edges were debeveled with a #15 scalpel blade.  The primary defect was closed partially with a complex linear closure.  Given the location of the defect, shape of the defect and the proximity to free margins an tissue cultured epidermal autograft was deemed most appropriate to repair the remaining defect.  The graft was trimmed to fit the size of the remaining defect.  The graft was then placed in the primary defect, oriented appropriately, and sutured into place.
Complex Repair And Skin Substitute Graft Text: The defect edges were debeveled with a #15 scalpel blade.  The primary defect was closed partially with a complex linear closure.  Given the location of the remaining defect, shape of the defect and the proximity to free margins a skin substitute graft was deemed most appropriate to repair the remaining defect.  The graft was trimmed to fit the size of the remaining defect.  The graft was then placed in the primary defect, oriented appropriately, and sutured into place.
Asa Classification: I
Time Everyone Was In The Room/Time Out: 4392
Time Procedure Started: 1306
Time Procedure Ended: 7749
Time Patient Discharged: [id number]
Path Notes (To The Dermatopathologist): Please check margins.Tagged at 12 o'clock
Consent was obtained from the patient. The risks and benefits to therapy were discussed in detail. Specifically, the risks of infection, scarring, bleeding, prolonged wound healing, incomplete removal, allergy to anesthesia, nerve injury and recurrence were addressed. Prior to the procedure, the treatment site was clearly identified and confirmed by the patient. All components of Universal Protocol/PAUSE Rule completed.
Post-Care Instructions: I reviewed with the patient in detail post-care instructions. Patient is not to engage in any heavy lifting, exercise, or swimming for the next 14 days. Should the patient develop any fevers, chills, bleeding, severe pain patient will contact the office immediately.
Home Suture Removal Text: Patient was provided a home suture removal kit and will remove their sutures at home.  If they have any questions or difficulties they will call the office.
Where Do You Want The Question To Include Opioid Counseling Located?: Case Summary Tab
Information: Selecting Yes will display possible errors in your note based on the variables you have selected. This validation is only offered as a suggestion for you. PLEASE NOTE THAT THE VALIDATION TEXT WILL BE REMOVED WHEN YOU FINALIZE YOUR NOTE. IF YOU WANT TO FAX A PRELIMINARY NOTE YOU WILL NEED TO TOGGLE THIS TO 'NO' IF YOU DO NOT WANT IT IN YOUR FAXED NOTE.

## 2020-07-20 ENCOUNTER — APPOINTMENT (RX ONLY)
Dept: URBAN - METROPOLITAN AREA CLINIC 61 | Facility: CLINIC | Age: 70
Setting detail: DERMATOLOGY
End: 2020-07-20

## 2020-07-20 DIAGNOSIS — Z48.02 ENCOUNTER FOR REMOVAL OF SUTURES: ICD-10-CM

## 2020-07-20 PROCEDURE — ? SUTURE REMOVAL (GLOBAL PERIOD)

## 2020-07-20 PROCEDURE — 99024 POSTOP FOLLOW-UP VISIT: CPT

## 2020-07-20 PROCEDURE — ? COUNSELING

## 2020-07-20 ASSESSMENT — LOCATION DETAILED DESCRIPTION DERM: LOCATION DETAILED: RIGHT SUPERIOR UPPER BACK

## 2020-07-20 ASSESSMENT — LOCATION SIMPLE DESCRIPTION DERM: LOCATION SIMPLE: RIGHT UPPER BACK

## 2020-07-20 ASSESSMENT — LOCATION ZONE DERM: LOCATION ZONE: TRUNK

## 2020-07-20 NOTE — PROCEDURE: SUTURE REMOVAL (GLOBAL PERIOD)
Detail Level: Detailed
Add 14490 Cpt? (Important Note: In 2017 The Use Of 38180 Is Being Tracked By Cms To Determine Future Global Period Reimbursement For Global Periods): yes

## 2020-10-20 ENCOUNTER — APPOINTMENT (RX ONLY)
Dept: URBAN - METROPOLITAN AREA CLINIC 61 | Facility: CLINIC | Age: 70
Setting detail: DERMATOLOGY
End: 2020-10-20

## 2020-10-20 DIAGNOSIS — D22 MELANOCYTIC NEVI: ICD-10-CM

## 2020-10-20 DIAGNOSIS — Z86.006 PERSONAL HISTORY OF MELANOMA IN-SITU: ICD-10-CM

## 2020-10-20 DIAGNOSIS — Z85.828 PERSONAL HISTORY OF OTHER MALIGNANT NEOPLASM OF SKIN: ICD-10-CM

## 2020-10-20 DIAGNOSIS — L82.1 OTHER SEBORRHEIC KERATOSIS: ICD-10-CM

## 2020-10-20 PROBLEM — Z85.820 PERSONAL HISTORY OF MALIGNANT MELANOMA OF SKIN: Status: ACTIVE | Noted: 2020-10-20

## 2020-10-20 PROBLEM — D22.5 MELANOCYTIC NEVI OF TRUNK: Status: ACTIVE | Noted: 2020-10-20

## 2020-10-20 PROCEDURE — ? COUNSELING

## 2020-10-20 PROCEDURE — 99214 OFFICE O/P EST MOD 30 MIN: CPT

## 2020-10-20 ASSESSMENT — LOCATION DETAILED DESCRIPTION DERM
LOCATION DETAILED: RIGHT SUPERIOR UPPER BACK
LOCATION DETAILED: LEFT MID-UPPER BACK
LOCATION DETAILED: RIGHT DISTAL LATERAL CALF
LOCATION DETAILED: RIGHT MID-UPPER BACK

## 2020-10-20 ASSESSMENT — LOCATION SIMPLE DESCRIPTION DERM
LOCATION SIMPLE: RIGHT UPPER BACK
LOCATION SIMPLE: LEFT UPPER BACK
LOCATION SIMPLE: RIGHT CALF

## 2020-10-20 ASSESSMENT — LOCATION ZONE DERM
LOCATION ZONE: TRUNK
LOCATION ZONE: LEG

## 2021-04-21 ENCOUNTER — APPOINTMENT (RX ONLY)
Dept: URBAN - METROPOLITAN AREA CLINIC 61 | Facility: CLINIC | Age: 71
Setting detail: DERMATOLOGY
End: 2021-04-21

## 2021-04-21 DIAGNOSIS — L82.1 OTHER SEBORRHEIC KERATOSIS: ICD-10-CM

## 2021-04-21 DIAGNOSIS — Z86.006 PERSONAL HISTORY OF MELANOMA IN-SITU: ICD-10-CM

## 2021-04-21 DIAGNOSIS — D22 MELANOCYTIC NEVI: ICD-10-CM

## 2021-04-21 DIAGNOSIS — Z71.89 OTHER SPECIFIED COUNSELING: ICD-10-CM

## 2021-04-21 DIAGNOSIS — L57.8 OTHER SKIN CHANGES DUE TO CHRONIC EXPOSURE TO NONIONIZING RADIATION: ICD-10-CM

## 2021-04-21 DIAGNOSIS — Z85.828 PERSONAL HISTORY OF OTHER MALIGNANT NEOPLASM OF SKIN: ICD-10-CM

## 2021-04-21 DIAGNOSIS — L57.0 ACTINIC KERATOSIS: ICD-10-CM

## 2021-04-21 PROBLEM — Z85.820 PERSONAL HISTORY OF MALIGNANT MELANOMA OF SKIN: Status: ACTIVE | Noted: 2021-04-21

## 2021-04-21 PROBLEM — D22.5 MELANOCYTIC NEVI OF TRUNK: Status: ACTIVE | Noted: 2021-04-21

## 2021-04-21 PROCEDURE — 17003 DESTRUCT PREMALG LES 2-14: CPT

## 2021-04-21 PROCEDURE — ? LIQUID NITROGEN

## 2021-04-21 PROCEDURE — 17000 DESTRUCT PREMALG LESION: CPT

## 2021-04-21 PROCEDURE — ? COUNSELING

## 2021-04-21 PROCEDURE — 99213 OFFICE O/P EST LOW 20 MIN: CPT | Mod: 25

## 2021-04-21 PROCEDURE — ? SUNSCREEN RECOMMENDATIONS

## 2021-04-21 ASSESSMENT — LOCATION ZONE DERM
LOCATION ZONE: ARM
LOCATION ZONE: SCALP
LOCATION ZONE: LEG
LOCATION ZONE: FACE
LOCATION ZONE: TRUNK

## 2021-04-21 ASSESSMENT — LOCATION SIMPLE DESCRIPTION DERM
LOCATION SIMPLE: LEFT UPPER BACK
LOCATION SIMPLE: LEFT UPPER ARM
LOCATION SIMPLE: RIGHT UPPER ARM
LOCATION SIMPLE: RIGHT CALF
LOCATION SIMPLE: POSTERIOR SCALP
LOCATION SIMPLE: RIGHT UPPER BACK
LOCATION SIMPLE: ABDOMEN
LOCATION SIMPLE: LEFT CHEEK
LOCATION SIMPLE: RIGHT CHEEK
LOCATION SIMPLE: LEFT SCALP

## 2021-04-21 ASSESSMENT — LOCATION DETAILED DESCRIPTION DERM
LOCATION DETAILED: RIGHT MID-UPPER BACK
LOCATION DETAILED: RIGHT SUPERIOR UPPER BACK
LOCATION DETAILED: RIGHT LATERAL MALAR CHEEK
LOCATION DETAILED: EPIGASTRIC SKIN
LOCATION DETAILED: RIGHT ANTERIOR DISTAL UPPER ARM
LOCATION DETAILED: LEFT MEDIAL FRONTAL SCALP
LOCATION DETAILED: LEFT CENTRAL MALAR CHEEK
LOCATION DETAILED: LEFT ANTERIOR PROXIMAL UPPER ARM
LOCATION DETAILED: POSTERIOR MID-PARIETAL SCALP
LOCATION DETAILED: RIGHT DISTAL LATERAL CALF
LOCATION DETAILED: LEFT MID-UPPER BACK

## 2021-04-21 ASSESSMENT — TOTAL NUMBER OF LESIONS: # OF LESIONS?: 2

## 2021-04-21 ASSESSMENT — PAIN INTENSITY VAS: HOW INTENSE IS YOUR PAIN 0 BEING NO PAIN, 10 BEING THE MOST SEVERE PAIN POSSIBLE?: NO PAIN

## 2021-04-21 NOTE — PROCEDURE: LIQUID NITROGEN
Post-Care Instructions: I reviewed with the patient in detail post-care instructions. Patient is to wear sunprotection, and avoid picking at any of the treated lesions. Pt may apply Vaseline to crusted or scabbing areas.
Total Number Of Aks Treated: 2
Detail Level: Zone
Render In Bullet Format When Appropriate: No
Consent: The patient's consent was obtained including but not limited to risks of crusting, scabbing, blistering, scarring, darker or lighter pigmentary change, recurrence, incomplete removal and infection.
Number Of Freeze-Thaw Cycles: 2 freeze-thaw cycles
Duration Of Freeze Thaw-Cycle (Seconds): 15

## 2021-04-21 NOTE — PROCEDURE: SUNSCREEN RECOMMENDATIONS

## 2022-04-22 ENCOUNTER — APPOINTMENT (RX ONLY)
Dept: URBAN - METROPOLITAN AREA CLINIC 61 | Facility: CLINIC | Age: 72
Setting detail: DERMATOLOGY
End: 2022-04-22

## 2022-04-22 DIAGNOSIS — Z85.820 PERSONAL HISTORY OF MALIGNANT MELANOMA OF SKIN: ICD-10-CM

## 2022-04-22 DIAGNOSIS — L82.1 OTHER SEBORRHEIC KERATOSIS: ICD-10-CM

## 2022-04-22 DIAGNOSIS — L57.0 ACTINIC KERATOSIS: ICD-10-CM

## 2022-04-22 DIAGNOSIS — L81.4 OTHER MELANIN HYPERPIGMENTATION: ICD-10-CM

## 2022-04-22 DIAGNOSIS — D22 MELANOCYTIC NEVI: ICD-10-CM

## 2022-04-22 PROBLEM — D22.62 MELANOCYTIC NEVI OF LEFT UPPER LIMB, INCLUDING SHOULDER: Status: ACTIVE | Noted: 2022-04-22

## 2022-04-22 PROBLEM — D22.5 MELANOCYTIC NEVI OF TRUNK: Status: ACTIVE | Noted: 2022-04-22

## 2022-04-22 PROBLEM — D22.61 MELANOCYTIC NEVI OF RIGHT UPPER LIMB, INCLUDING SHOULDER: Status: ACTIVE | Noted: 2022-04-22

## 2022-04-22 PROCEDURE — 99213 OFFICE O/P EST LOW 20 MIN: CPT | Mod: 25

## 2022-04-22 PROCEDURE — ? LIQUID NITROGEN

## 2022-04-22 PROCEDURE — ? COUNSELING

## 2022-04-22 PROCEDURE — 17000 DESTRUCT PREMALG LESION: CPT

## 2022-04-22 ASSESSMENT — LOCATION DETAILED DESCRIPTION DERM
LOCATION DETAILED: RIGHT ANTERIOR DISTAL UPPER ARM
LOCATION DETAILED: LEFT SUPERIOR MEDIAL UPPER BACK
LOCATION DETAILED: RIGHT POSTERIOR SHOULDER
LOCATION DETAILED: RIGHT MEDIAL TEMPLE
LOCATION DETAILED: PERIUMBILICAL SKIN
LOCATION DETAILED: RIGHT SUPERIOR UPPER BACK
LOCATION DETAILED: LEFT ANTERIOR DISTAL UPPER ARM
LOCATION DETAILED: EPIGASTRIC SKIN
LOCATION DETAILED: LEFT POSTERIOR SHOULDER
LOCATION DETAILED: LEFT PROXIMAL POSTERIOR UPPER ARM
LOCATION DETAILED: LEFT MID-UPPER BACK
LOCATION DETAILED: RIGHT PROXIMAL POSTERIOR UPPER ARM

## 2022-04-22 ASSESSMENT — LOCATION SIMPLE DESCRIPTION DERM
LOCATION SIMPLE: RIGHT UPPER BACK
LOCATION SIMPLE: RIGHT TEMPLE
LOCATION SIMPLE: LEFT SHOULDER
LOCATION SIMPLE: RIGHT UPPER ARM
LOCATION SIMPLE: LEFT UPPER BACK
LOCATION SIMPLE: LEFT UPPER ARM
LOCATION SIMPLE: RIGHT SHOULDER
LOCATION SIMPLE: ABDOMEN

## 2022-04-22 ASSESSMENT — LOCATION ZONE DERM
LOCATION ZONE: TRUNK
LOCATION ZONE: FACE
LOCATION ZONE: ARM

## 2022-04-22 ASSESSMENT — PAIN INTENSITY VAS: HOW INTENSE IS YOUR PAIN 0 BEING NO PAIN, 10 BEING THE MOST SEVERE PAIN POSSIBLE?: NO PAIN

## 2022-04-22 ASSESSMENT — TOTAL NUMBER OF LESIONS: # OF LESIONS?: 1

## 2022-04-22 NOTE — PROCEDURE: LIQUID NITROGEN
Post-Care Instructions: I reviewed with the patient in detail post-care instructions. Patient is to wear sunprotection, and avoid picking at any of the treated lesions. Pt may apply Vaseline to crusted or scabbing areas.
Duration Of Freeze Thaw-Cycle (Seconds): 30
Render Note In Bullet Format When Appropriate: No
Show Applicator Variable?: Yes
Detail Level: Detailed
Consent: The patient's consent was obtained including but not limited to risks of crusting, scabbing, blistering, scarring, darker or lighter pigmentary change, recurrence, incomplete removal and infection.
Number Of Freeze-Thaw Cycles: 2 freeze-thaw cycles

## 2022-04-22 NOTE — HPI: EVALUATION OF SKIN LESION(S)
What Type Of Note Output Would You Prefer (Optional)?: Standard Output
Hpi Title: Evaluation of Skin Lesions
Year Removed: 1900
- - -

## 2023-04-28 ENCOUNTER — APPOINTMENT (RX ONLY)
Dept: URBAN - METROPOLITAN AREA CLINIC 61 | Facility: CLINIC | Age: 73
Setting detail: DERMATOLOGY
End: 2023-04-28

## 2023-04-28 DIAGNOSIS — Z85.820 PERSONAL HISTORY OF MALIGNANT MELANOMA OF SKIN: ICD-10-CM

## 2023-04-28 DIAGNOSIS — Z85.828 PERSONAL HISTORY OF OTHER MALIGNANT NEOPLASM OF SKIN: ICD-10-CM

## 2023-04-28 DIAGNOSIS — D17 BENIGN LIPOMATOUS NEOPLASM: ICD-10-CM

## 2023-04-28 DIAGNOSIS — D22 MELANOCYTIC NEVI: ICD-10-CM

## 2023-04-28 DIAGNOSIS — L82.1 OTHER SEBORRHEIC KERATOSIS: ICD-10-CM

## 2023-04-28 PROBLEM — D22.5 MELANOCYTIC NEVI OF TRUNK: Status: ACTIVE | Noted: 2023-04-28

## 2023-04-28 PROBLEM — D17.21 BENIGN LIPOMATOUS NEOPLASM OF SKIN AND SUBCUTANEOUS TISSUE OF RIGHT ARM: Status: ACTIVE | Noted: 2023-04-28

## 2023-04-28 PROCEDURE — 99213 OFFICE O/P EST LOW 20 MIN: CPT

## 2023-04-28 PROCEDURE — ? COUNSELING

## 2023-04-28 ASSESSMENT — LOCATION SIMPLE DESCRIPTION DERM
LOCATION SIMPLE: RIGHT UPPER BACK
LOCATION SIMPLE: ABDOMEN
LOCATION SIMPLE: RIGHT FOREARM
LOCATION SIMPLE: RIGHT CHEEK

## 2023-04-28 ASSESSMENT — LOCATION DETAILED DESCRIPTION DERM
LOCATION DETAILED: RIGHT CENTRAL MALAR CHEEK
LOCATION DETAILED: PERIUMBILICAL SKIN
LOCATION DETAILED: RIGHT VENTRAL PROXIMAL FOREARM
LOCATION DETAILED: RIGHT SUPERIOR UPPER BACK

## 2023-04-28 ASSESSMENT — LOCATION ZONE DERM
LOCATION ZONE: FACE
LOCATION ZONE: TRUNK
LOCATION ZONE: ARM

## 2023-04-28 NOTE — PROCEDURE: MIPS QUALITY
Detail Level: Detailed
Quality 111:Pneumonia Vaccination Status For Older Adults: Patient did not receive any pneumococcal conjugate or polysaccharide vaccine on or after their 60th birthday and before the end of the measurement period
Quality 137: Melanoma: Continuity Of Care - Recall System: Patient information entered into a recall system that includes: target date for the next exam specified AND a process to follow up with patients regarding missed or unscheduled appointments

## 2024-05-03 ENCOUNTER — APPOINTMENT (RX ONLY)
Dept: URBAN - METROPOLITAN AREA CLINIC 61 | Facility: CLINIC | Age: 74
Setting detail: DERMATOLOGY
End: 2024-05-03

## 2024-05-03 DIAGNOSIS — Z85.820 PERSONAL HISTORY OF MALIGNANT MELANOMA OF SKIN: ICD-10-CM

## 2024-05-03 DIAGNOSIS — L82.1 OTHER SEBORRHEIC KERATOSIS: ICD-10-CM

## 2024-05-03 DIAGNOSIS — Z85.828 PERSONAL HISTORY OF OTHER MALIGNANT NEOPLASM OF SKIN: ICD-10-CM

## 2024-05-03 DIAGNOSIS — D22 MELANOCYTIC NEVI: ICD-10-CM

## 2024-05-03 PROBLEM — D22.5 MELANOCYTIC NEVI OF TRUNK: Status: ACTIVE | Noted: 2024-05-03

## 2024-05-03 PROCEDURE — ? COUNSELING

## 2024-05-03 PROCEDURE — 99213 OFFICE O/P EST LOW 20 MIN: CPT

## 2024-05-03 ASSESSMENT — LOCATION SIMPLE DESCRIPTION DERM
LOCATION SIMPLE: RIGHT CHEEK
LOCATION SIMPLE: ABDOMEN
LOCATION SIMPLE: RIGHT UPPER BACK

## 2024-05-03 ASSESSMENT — LOCATION DETAILED DESCRIPTION DERM
LOCATION DETAILED: PERIUMBILICAL SKIN
LOCATION DETAILED: RIGHT SUPERIOR UPPER BACK
LOCATION DETAILED: RIGHT CENTRAL MALAR CHEEK

## 2024-05-03 ASSESSMENT — LOCATION ZONE DERM
LOCATION ZONE: FACE
LOCATION ZONE: TRUNK

## 2025-06-09 ENCOUNTER — APPOINTMENT (OUTPATIENT)
Dept: URBAN - METROPOLITAN AREA CLINIC 61 | Facility: CLINIC | Age: 75
Setting detail: DERMATOLOGY
End: 2025-06-09

## 2025-06-09 DIAGNOSIS — Z85.828 PERSONAL HISTORY OF OTHER MALIGNANT NEOPLASM OF SKIN: ICD-10-CM

## 2025-06-09 DIAGNOSIS — Z85.820 PERSONAL HISTORY OF MALIGNANT MELANOMA OF SKIN: ICD-10-CM

## 2025-06-09 DIAGNOSIS — L82.1 OTHER SEBORRHEIC KERATOSIS: ICD-10-CM

## 2025-06-09 DIAGNOSIS — D22 MELANOCYTIC NEVI: ICD-10-CM

## 2025-06-09 DIAGNOSIS — L57.0 ACTINIC KERATOSIS: ICD-10-CM

## 2025-06-09 PROBLEM — D22.5 MELANOCYTIC NEVI OF TRUNK: Status: ACTIVE | Noted: 2025-06-09

## 2025-06-09 PROCEDURE — 99213 OFFICE O/P EST LOW 20 MIN: CPT | Mod: 25

## 2025-06-09 PROCEDURE — 17000 DESTRUCT PREMALG LESION: CPT

## 2025-06-09 PROCEDURE — ? LIQUID NITROGEN

## 2025-06-09 PROCEDURE — ? COUNSELING

## 2025-06-09 PROCEDURE — 17003 DESTRUCT PREMALG LES 2-14: CPT

## 2025-06-09 ASSESSMENT — LOCATION DETAILED DESCRIPTION DERM
LOCATION DETAILED: RIGHT NASAL SIDEWALL
LOCATION DETAILED: LEFT DISTAL DORSAL FOREARM
LOCATION DETAILED: RIGHT CENTRAL MALAR CHEEK
LOCATION DETAILED: RIGHT SUPERIOR UPPER BACK
LOCATION DETAILED: PERIUMBILICAL SKIN

## 2025-06-09 ASSESSMENT — LOCATION SIMPLE DESCRIPTION DERM
LOCATION SIMPLE: LEFT FOREARM
LOCATION SIMPLE: RIGHT CHEEK
LOCATION SIMPLE: RIGHT UPPER BACK
LOCATION SIMPLE: RIGHT NOSE
LOCATION SIMPLE: ABDOMEN

## 2025-06-09 ASSESSMENT — LOCATION ZONE DERM
LOCATION ZONE: ARM
LOCATION ZONE: TRUNK
LOCATION ZONE: NOSE
LOCATION ZONE: FACE

## 2025-06-09 NOTE — PROCEDURE: LIQUID NITROGEN
Number Of Freeze-Thaw Cycles: 2 freeze-thaw cycles
Duration Of Freeze Thaw-Cycle (Seconds): 15
Post-Care Instructions: I reviewed with the patient in detail post-care instructions. Patient is to wear sunprotection, and avoid picking at any of the treated lesions. Pt may apply Vaseline to crusted or scabbing areas.
Detail Level: Zone
Consent: The patient's consent was obtained including but not limited to risks of crusting, scabbing, blistering, scarring, darker or lighter pigmentary change, recurrence, incomplete removal and infection.
Total Number Of Aks Treated: 2
Render In Bullet Format When Appropriate: No